# Patient Record
Sex: FEMALE | Race: OTHER | URBAN - METROPOLITAN AREA
[De-identification: names, ages, dates, MRNs, and addresses within clinical notes are randomized per-mention and may not be internally consistent; named-entity substitution may affect disease eponyms.]

---

## 2023-12-08 LAB
ABO, EXTERNAL RESULT: NORMAL
C. TRACHOMATIS, EXTERNAL RESULT: NEGATIVE
HEP B, EXTERNAL RESULT: NEGATIVE
HEPATITIS C ANTIBODY, EXTERNAL RESULT: NEGATIVE
HIV, EXTERNAL RESULT: NEGATIVE
N. GONORRHOEAE, EXTERNAL RESULT: NEGATIVE
RH FACTOR, EXTERNAL RESULT: POSITIVE
RPR, EXTERNAL RESULT: NON REACTIVE
RUBELLA TITER, EXTERNAL RESULT: NORMAL

## 2023-12-27 DIAGNOSIS — O30.032 MONOCHORIONIC DIAMNIOTIC TWIN GESTATION IN SECOND TRIMESTER: ICD-10-CM

## 2024-01-10 ENCOUNTER — ROUTINE PRENATAL (OUTPATIENT)
Age: 34
End: 2024-01-10
Payer: MEDICAID

## 2024-01-10 VITALS — HEART RATE: 114 BPM | DIASTOLIC BLOOD PRESSURE: 71 MMHG | SYSTOLIC BLOOD PRESSURE: 106 MMHG

## 2024-01-10 DIAGNOSIS — O30.032 MONOCHORIONIC DIAMNIOTIC TWIN GESTATION IN SECOND TRIMESTER: Primary | ICD-10-CM

## 2024-01-10 DIAGNOSIS — O09.522 MULTIGRAVIDA OF ADVANCED MATERNAL AGE IN SECOND TRIMESTER: ICD-10-CM

## 2024-01-10 PROCEDURE — 76821 MIDDLE CEREBRAL ARTERY ECHO: CPT | Performed by: OBSTETRICS & GYNECOLOGY

## 2024-01-10 PROCEDURE — 76810 OB US >/= 14 WKS ADDL FETUS: CPT | Performed by: OBSTETRICS & GYNECOLOGY

## 2024-01-10 PROCEDURE — 99213 OFFICE O/P EST LOW 20 MIN: CPT | Performed by: OBSTETRICS & GYNECOLOGY

## 2024-01-10 PROCEDURE — 76820 UMBILICAL ARTERY ECHO: CPT | Performed by: OBSTETRICS & GYNECOLOGY

## 2024-01-10 PROCEDURE — 76816 OB US FOLLOW-UP PER FETUS: CPT | Performed by: OBSTETRICS & GYNECOLOGY

## 2024-01-10 RX ORDER — ASPIRIN 81 MG/1
81 TABLET ORAL DAILY
COMMUNITY

## 2024-01-11 NOTE — PROCEDURES
Yenny  .1 mm 25w 0d 17% Hadlock  .4 mm 26w 1d 68% Hadlock  Femur 47.1 mm 25w 5d 50% Hadlock  Humerus 41.7 mm 25w 1d 36% Yenny   g 25w 4d 64% Hadlock  EFW discordance 7.2 %  EFW (lb) 1 lb  EFW (oz) 14 oz  EFW by: Hadlock (BPD-HC-AC-FL)  Extended   3.5 mm  Other Structures   bpm    General Evaluation  ==============    Cardiac activity present.  bpm. Fetal movements: visualized. Presentation: Transverse, head to maternal left  Placenta: Placental site: Anterior  Umbilical cord: Cord vessels: 3 vessel cord  Amniotic fluid: Amount of AF: normal. MVP 5.6 cm    Fetal Anatomy  ===========    Lateral ventricles: normal  Midline falx: normal  Cavum septi pellucidi: normal  Heart / Thorax  Ductal arch view: NOT VISUALIZED  Cardiac rhythm: regular (normal)  Diaphragm: normal  Stomach: normal  Kidneys: normal  Bladder: normal  Rt fingers: normal  Lt fingers: NOT VISUALIZED  Wants to know fetal sex: yes    Fetal Doppler  ===========    Arterial  Umbilical artery: normal  Umbilical A PI 1.47  99% Ebbing  Umbilical A RI 0.78  90% Stanley  Umbilical A PS 29.09 cm/s  1% Ebbing  Umbilical A ED 6.32 cm/s  Umbilical A EDF: positive  Umbilical A TAmax 15.52 cm/s  <1% Ebbing  Umbilical A MD 6.17 cm/s  Umbilical A S / D 4.60  93% Stanley  Umbilical A  bpm  Right mid cerebral artery: normal  Rt MCA PI 1.84  35% Ebbing  Rt MCA RI 0.80  55% Bahlmann  Rt MCA PS 29.42 cm/s  Rt MCA ED 6.17 cm/s  Rt MCA TAmax 13.06 cm/s  35% Ebbing  Rt MCA MD 5.79 cm/s  Rt MCA S / D 4.85  Rt MCA  bpm    Fetal Doppler  ===========    Arterial  Umbilical artery: normal  Umbilical A PI 1.25  87% Ebbing  Umbilical A RI 0.72  67% Stanley  Umbilical A PS 37.80 cm/s  30% Ebbing  Umbilical A ED 10.73 cm/s  Umbilical A EDF: positive  Umbilical A TAmax 24.33 cm/s  34% Ebbing  Umbilical A MD 10.55 cm/s  Umbilical A S / D 3.52  59% Stanley  Umbilical A  bpm  Right mid cerebral artery: normal  Rt MCA PI 1.98  52%

## 2024-01-15 LAB
CFDNA.FET/CFDNA.TOTAL SFR FETUS: ABNORMAL %
CITATION REF LAB TEST: ABNORMAL
FET 13+18+21+X+Y ANEUP PLAS.CFDNA: ABNORMAL
GA EST FROM CONCEPTION DATE: ABNORMAL D
GESTATIONAL AGE > OR = 9 WEEKS: YES
LAB DIRECTOR NAME PROVIDER: ABNORMAL
LAB DIRECTOR NAME PROVIDER: ABNORMAL
LABORATORY COMMENT REPORT: ABNORMAL
LIMITATIONS OF THE TEST: ABNORMAL
Lab: ABNORMAL
NEGATIVE PREDICTIVE VALUE: ABNORMAL
PERFORMANCE CHARACTERISTICS: ABNORMAL
REF LAB TEST METHOD: ABNORMAL
TEST PERFORMANCE INFO SPEC: ABNORMAL

## 2024-01-18 ENCOUNTER — TELEPHONE (OUTPATIENT)
Age: 34
End: 2024-01-18

## 2024-01-19 ENCOUNTER — INITIAL PRENATAL (OUTPATIENT)
Age: 34
End: 2024-01-19

## 2024-01-19 VITALS
WEIGHT: 213.6 LBS | HEIGHT: 66 IN | SYSTOLIC BLOOD PRESSURE: 102 MMHG | BODY MASS INDEX: 34.33 KG/M2 | DIASTOLIC BLOOD PRESSURE: 60 MMHG

## 2024-01-19 DIAGNOSIS — Z34.90 PREGNANCY, UNSPECIFIED GESTATIONAL AGE: Primary | ICD-10-CM

## 2024-01-19 NOTE — PROGRESS NOTES
.  
palpable  Hernias: no hernias identified    Skin  General Inspection: no rash, no lesions identified    Neurologic/Psychiatric  Mental Status:  Orientation: grossly oriented to person, place and time  Mood and Affect: mood normal, affect appropriate      Assessment/Plan:  Primary Provider: Adam    33 y.o.  with mono-di twin gestation at 26w4d transferring care from [facility].     H/o TSVD x4, uncomplicated pregnancies, TT 1sl68ir. One of her children  at 4 months of age from pneumonia.    Unable to view records from [facility] due to small size when scanned into media --> Requesting that the records be resent.    IUP: MONO-DI TWINS  -Anatomy scan w/ MFM --> last scan 1/10/24: Fetus A: cephalic, maternal leF presenta on. Placenta is Anterior. EFW is 798 g at 41% and AC at 20%. Anatomy visualized as stated above. Maximum vertical pocket is normal, 4.8 cm. Fetus B: Transverse, head to maternal leF presenta on. Placenta is Anterior. EFW is 860 g at 64% and AC at 68%. Anatomy visualized as stated above. Maximum tez jose pocket is normal, 5.6 cm. EFW discordance is 7.2 %. UAD & MCA WNL x 2.  TTTS check in 2wks.  -MFM scheduled     Pregnancy Problems:  -AMA    PMH: benign    Genetics/Carrier screening: NIPT drawn with MFM, results pending    PNL: request records for New OB labs  -Glucola: next visit  -28 week labs: next visit  -GBS:    Vaccines:  -Flu:  -Covid:  -Tdap: next visit  -Rhogam:    Delivery/PP plans:  -Breast/Formula  -NCB/Epid  -Gender/Circ?    Social:  -FOB:  -Citizen of [place]-speaking, from [facility]. 2 children are here with her, and one child still in [facility].    RTC: 2 weeks or sooner angel luis Medina MD  2024  4:45 PM

## 2024-01-24 ENCOUNTER — ROUTINE PRENATAL (OUTPATIENT)
Age: 34
End: 2024-01-24
Payer: MEDICAID

## 2024-01-24 VITALS — DIASTOLIC BLOOD PRESSURE: 76 MMHG | HEART RATE: 108 BPM | SYSTOLIC BLOOD PRESSURE: 122 MMHG

## 2024-01-24 DIAGNOSIS — O30.032 MONOCHORIONIC DIAMNIOTIC TWIN GESTATION IN SECOND TRIMESTER: Primary | ICD-10-CM

## 2024-01-24 DIAGNOSIS — O30.032 MONOCHORIONIC DIAMNIOTIC TWIN GESTATION IN SECOND TRIMESTER: ICD-10-CM

## 2024-01-24 DIAGNOSIS — O09.522 MULTIGRAVIDA OF ADVANCED MATERNAL AGE IN SECOND TRIMESTER: ICD-10-CM

## 2024-01-24 PROCEDURE — 99213 OFFICE O/P EST LOW 20 MIN: CPT | Performed by: OBSTETRICS & GYNECOLOGY

## 2024-01-24 PROCEDURE — 76810 OB US >/= 14 WKS ADDL FETUS: CPT | Performed by: OBSTETRICS & GYNECOLOGY

## 2024-01-24 PROCEDURE — 76816 OB US FOLLOW-UP PER FETUS: CPT | Performed by: OBSTETRICS & GYNECOLOGY

## 2024-01-24 PROCEDURE — 76821 MIDDLE CEREBRAL ARTERY ECHO: CPT | Performed by: OBSTETRICS & GYNECOLOGY

## 2024-01-24 PROCEDURE — 76820 UMBILICAL ARTERY ECHO: CPT | Performed by: OBSTETRICS & GYNECOLOGY

## 2024-01-24 PROCEDURE — 76815 OB US LIMITED FETUS(S): CPT | Performed by: OBSTETRICS & GYNECOLOGY

## 2024-01-24 NOTE — PROCEDURES
PATIENT: SHELLY PATTERSON   -  : 1990   -  DOS:2024   -  INTERPRETING PROVIDER:Kathleen Springer,   Indication  ========    Mono/Di twins, AMA    Method  ======    Transabdominal ultrasound examination. View: Sufficient    Pregnancy  =========    twin pregnancy. Number of fetuses: 2. Monochorionic-diamniotic    Dating  ======    LMP on: 2023  Cycle: regular cycle  GA by LMP 27 w + 2 d  MORALES by LMP: 2024  Assigned: based on the LMP, selected on 2023  Assigned GA 27 w + 2 d  Assigned MORALES: 2024    General Evaluation  ==============    Cardiac activity present.  bpm. Fetal movements: visualized. Presentation: cephalic, maternal left  Placenta: Placental site: anterior, appropriate distance from the internal os  Amniotic fluid: Amount of AF: normal. MVP 4.0 cm    Fetal Anatomy  ===========    Lateral ventricles: normal  4-chamber view: normal  Stomach: normal  Kidneys: normal  Bladder: normal  Wants to know fetal sex: yes    Fetal Doppler  ===========    Arterial  Umbilical A PI 1.16 81% Ebbing  Umbilical A RI 0.74 84% Stanley  Umbilical A PS 31.54 cm/s 2% Ebbing  Umbilical A ED 8.21 cm/s  Umbilical A TAmax 20.04 cm/s 3% Ebbing  Umbilical A MD 8.15 cm/s  Umbilical A S / D 3.84 82% Stanley  Umbilical A  bpm  Rt MCA PI 1.44 2% Ebbing  Rt MCA RI 0.72 17% Bahlmann  Rt MCA PS 25.80 cm/s  Rt MCA ED 7.14 cm/s  Rt MCA TAmax 12.99 cm/s 19% Ebbing  Rt MCA MD 7.00 cm/s  Rt MCA S / D 3.61  Rt MCA  bpm    General Evaluation  ==============    Cardiac activity present.  bpm. Fetal movements: visualized. Presentation: Transverse, head to maternal right  Placenta: Placental site: anterior, appropriate distance from the internal os  Amniotic fluid: Amount of AF: normal. MVP 6.9 cm    Fetal Anatomy  ===========    Lateral ventricles: normal  4-chamber view: normal  Stomach: normal  Kidneys: normal  Bladder: normal  Wants to know fetal sex: yes    Fetal

## 2024-01-25 ENCOUNTER — CLINICAL DOCUMENTATION (OUTPATIENT)
Age: 34
End: 2024-01-25

## 2024-01-25 DIAGNOSIS — Z34.90 PREGNANCY, UNSPECIFIED GESTATIONAL AGE: Primary | ICD-10-CM

## 2024-01-29 ENCOUNTER — ROUTINE PRENATAL (OUTPATIENT)
Age: 34
End: 2024-01-29
Payer: MEDICAID

## 2024-01-29 VITALS — BODY MASS INDEX: 34.38 KG/M2 | SYSTOLIC BLOOD PRESSURE: 116 MMHG | DIASTOLIC BLOOD PRESSURE: 74 MMHG | WEIGHT: 213 LBS

## 2024-01-29 DIAGNOSIS — Z23 ENCOUNTER FOR IMMUNIZATION: ICD-10-CM

## 2024-01-29 DIAGNOSIS — Z34.90 PREGNANCY, UNSPECIFIED GESTATIONAL AGE: Primary | ICD-10-CM

## 2024-01-29 LAB
BLOOD BANK CMNT PATIENT-IMP: NORMAL
BLOOD GROUP ANTIBODIES SERPL: NORMAL
CFDNA.FET/CFDNA.TOTAL SFR FETUS: ABNORMAL %
CITATION REF LAB TEST: ABNORMAL
ERYTHROCYTE [DISTWIDTH] IN BLOOD BY AUTOMATED COUNT: 12.8 % (ref 11.5–14.5)
FET 13+18+21+X+Y ANEUP PLAS.CFDNA: ABNORMAL
GA EST FROM CONCEPTION DATE: ABNORMAL D
GESTATIONAL AGE > OR = 9 WEEKS: YES
GLUCOSE 1H P 100 G GLC PO SERPL-MCNC: 146 MG/DL (ref 65–140)
HCT VFR BLD AUTO: 34.6 % (ref 35–47)
HGB BLD-MCNC: 11 G/DL (ref 11.5–16)
HIV 1+2 AB+HIV1 P24 AG SERPL QL IA: NONREACTIVE
HIV 1/2 RESULT COMMENT: NORMAL
LAB DIRECTOR NAME PROVIDER: ABNORMAL
LAB DIRECTOR NAME PROVIDER: ABNORMAL
LABORATORY COMMENT REPORT: ABNORMAL
LIMITATIONS OF THE TEST: ABNORMAL
Lab: ABNORMAL
MCH RBC QN AUTO: 28.3 PG (ref 26–34)
MCHC RBC AUTO-ENTMCNC: 31.8 G/DL (ref 30–36.5)
MCV RBC AUTO: 88.9 FL (ref 80–99)
NEGATIVE PREDICTIVE VALUE: ABNORMAL
NRBC # BLD: 0 K/UL (ref 0–0.01)
NRBC BLD-RTO: 0 PER 100 WBC
PERFORMANCE CHARACTERISTICS: ABNORMAL
PLATELET # BLD AUTO: 375 K/UL (ref 150–400)
PMV BLD AUTO: 10 FL (ref 8.9–12.9)
RBC # BLD AUTO: 3.89 M/UL (ref 3.8–5.2)
REF LAB TEST METHOD: ABNORMAL
T. PALLIDUM (SYPHILIS) ANTIBODY, EXTERNAL RESULT: NON REACTIVE
TEST PERFORMANCE INFO SPEC: ABNORMAL
WBC # BLD AUTO: 9.5 K/UL (ref 3.6–11)

## 2024-01-29 PROCEDURE — 90715 TDAP VACCINE 7 YRS/> IM: CPT | Performed by: OBSTETRICS & GYNECOLOGY

## 2024-01-29 PROCEDURE — 90471 IMMUNIZATION ADMIN: CPT | Performed by: OBSTETRICS & GYNECOLOGY

## 2024-01-29 PROCEDURE — 0502F SUBSEQUENT PRENATAL CARE: CPT | Performed by: OBSTETRICS & GYNECOLOGY

## 2024-01-29 NOTE — PROGRESS NOTES
# [id number] used to conduct today's visit  1 hr gtt and 3rd tri labs today  Accepts Tdap today  Buzz MATHEW  Saw [facility] 1/24, follow up 2/8    Fetus A: cephalic, maternal Mara presentation. Cardiac activity is present. MVP is 4 cm. Anatomy visualized as stated above. Placental site is anterior, appropriate distance from the internal os. Umbilical and middle cerebral artery Doppler studies were performed and are WNL     Fetus B: Transverse, head to maternal right presentation. Cardiac activity is present. MVP is 6.9 cm. Anatomy visualized as stated above. Placental site is anterior, appropriate distance from the internal os. Umbilical and middle cerebral artery Doppler studies were performed and are WNL    -cfDNA was drawn 2 weeks ago but cancelled by [facility] due to incorrect dating on form (patient had postponed testing for 3 weeks); testing was re-ordered today and patient was instructed to go to [facility] today.  Per patient did go to [facility] and have [id number] testing done on 1/25

## 2024-01-30 LAB — T PALLIDUM AB SER QL IA: NON REACTIVE

## 2024-01-31 ENCOUNTER — TELEPHONE (OUTPATIENT)
Age: 34
End: 2024-01-31

## 2024-01-31 NOTE — TELEPHONE ENCOUNTER
Attempted to reach patient via  services, # [phone].  Unable to leave a voicemail; voicemail box has not been set up yet.  Will try again later.

## 2024-02-05 ENCOUNTER — TELEPHONE (OUTPATIENT)
Age: 34
End: 2024-02-05

## 2024-02-05 NOTE — TELEPHONE ENCOUNTER
Spoke with patient via  services, # [id number].  Advised of results and recommendations.  Appt scheduled for Wednesday 2/7 @ 0800, aware npo after midnight.

## 2024-02-07 ENCOUNTER — NURSE ONLY (OUTPATIENT)
Age: 34
End: 2024-02-07

## 2024-02-07 DIAGNOSIS — R73.09 ELEVATED GLUCOSE TOLERANCE TEST: Primary | ICD-10-CM

## 2024-02-07 LAB
GESTATIONAL 3HR GTT: NORMAL
GLUCOSE 1H P 100 G GLC PO SERPL-MCNC: 151 MG/DL (ref 65–180)
GLUCOSE 2 HOUR: 115 MG/DL (ref 65–155)
GLUCOSE P FAST SERPL-MCNC: 82 MG/DL (ref 65–95)
GLUCOSE, 3 HOUR: 96 MG/DL (ref 65–140)

## 2024-02-08 ENCOUNTER — ROUTINE PRENATAL (OUTPATIENT)
Age: 34
End: 2024-02-08
Payer: MEDICAID

## 2024-02-08 VITALS — DIASTOLIC BLOOD PRESSURE: 66 MMHG | HEART RATE: 67 BPM | SYSTOLIC BLOOD PRESSURE: 104 MMHG

## 2024-02-08 DIAGNOSIS — O09.522 MULTIGRAVIDA OF ADVANCED MATERNAL AGE IN SECOND TRIMESTER: ICD-10-CM

## 2024-02-08 DIAGNOSIS — O30.033 MONOCHORIONIC DIAMNIOTIC TWIN GESTATION IN THIRD TRIMESTER: Primary | ICD-10-CM

## 2024-02-08 PROCEDURE — 76810 OB US >/= 14 WKS ADDL FETUS: CPT | Performed by: OBSTETRICS & GYNECOLOGY

## 2024-02-08 PROCEDURE — 99213 OFFICE O/P EST LOW 20 MIN: CPT

## 2024-02-08 PROCEDURE — 76816 OB US FOLLOW-UP PER FETUS: CPT | Performed by: OBSTETRICS & GYNECOLOGY

## 2024-02-08 NOTE — PROCEDURES
Structures   bpm    General Evaluation  ==============    Cardiac activity present.  bpm. Fetal movements: visualized. Presentation: transverse head maternal left, superior  Placenta: Placental site: anterior, appropriate distance from the internal os  Amniotic fluid: Amount of AF: normal. MVP 4.1 cm    Fetal Anatomy  ===========    Stomach: normal  Kidneys: normal  Bladder: normal  Wants to know fetal sex: yes    Fetal Doppler  ===========    Arterial  Umbilical artery: normal  Umbilical A sampling site: midcord  Umbilical A PI 1.20  91% Ebbing  Umbilical A RI 0.72  85% Stanley  Umbilical A PS -43.97 cm/s  Umbilical A ED -13.17 cm/s  Umbilical A EDF: positive  Umbilical A TAmax -26.68 cm/s  Umbilical A MD -12.67 cm/s  Umbilical A S / D 3.52  79% Stanley  Umbilical A  bpm  MCA PS 36.08 cm/s  MoM 0.92  Right mid cerebral artery: normal  Rt MCA PI 1.85  18% Ebbing  Rt MCA RI 0.78  40% Bahlmann  Rt MCA PS 36.08 cm/s  Rt MCA ED 8.49 cm/s  Rt MCA TAmax 15.18 cm/s  28% Ebbing  Rt MCA MD 6.53 cm/s  Rt MCA S / D 4.50  Rt MCA  bpm    Fetal Doppler  ===========    Arterial  Umbilical artery: normal  Umbilical A sampling site: midcord  Umbilical A PI 1.33  98% Ebbing  Umbilical A RI 0.76  94% Stanley  Umbilical A PS 50.47 cm/s  76% Ebbing  Umbilical A ED 12.83 cm/s  Umbilical A EDF: positive  Umbilical A TAmax 28.95 cm/s  47% Ebbing  Umbilical A MD 12.42 cm/s  Umbilical A S / D 4.24  96% Stanley  Umbilical A  bpm  MCA PS 38.76 cm/s  MoM 0.98  Right mid cerebral artery: normal  Rt MCA PI 1.96  28% Ebbing  Rt MCA RI 0.82  61% Bahlmann  Rt MCA PS 38.76 cm/s  Rt MCA ED 6.68 cm/s  Rt MCA TAmax 16.01 cm/s  38% Ebbing  Rt MCA MD 6.30 cm/s  Rt MCA S / D 5.57  Rt MCA  bpm    Findings  =======    Viable Monochorionic-diamniotic twin pregnancy at 29w 3d by clinical dates.    Fetus A: cephalic, maternal left presentation. Placenta is anterior, appropriate distance from the internal os. EFW is 1363 g

## 2024-02-08 NOTE — PROGRESS NOTES
ASSESSMENT/PLAN:  1. Monochorionic diamniotic twin gestation in third trimester  2. Multigravida of advanced maternal age in second trimester   Latisha #[id number] was used to conduct this visit.     Nano is a 32 yo  with the following issues:      1) monochorionic diamniotic twin pregnancy  -she is on ldASA   -discussed no signs TTTS today     2) AMA  -cfDNA was drawn and no results today. [facility] contacted, states results should be available in 1 week.   -24=3hr gtt wnl   -Kick count and PIH precautions reviewed.     Discussed Follow up 2 weeks BPP then  surveillance weekly thereafter.     Please see [facility]point for ultrasound findings.      Subjective   Nano Canales (:  1990) is a 33 y.o. female,Established patient, here for evaluation of the following chief complaint(s):  1. Monochorionic diamniotic twin gestation in third trimester  2. Multigravida of advanced maternal age in second trimester     Objective   Physical Exam  Vitals reviewed.   Constitutional:       Appearance: Normal appearance.   Neurological:      Mental Status: She is alert.   Psychiatric:         Mood and Affect: Mood normal.         Judgment: Judgment normal.          On this date 2024 I have spent 25 minutes reviewing previous notes, test results and face to face with the patient discussing the diagnosis and importance of compliance with the treatment plan as well as documenting on the day of the visit.      An electronic signature was used to authenticate this note.    --ROSA Raymond - CNP

## 2024-02-10 LAB
CFDNA.FET/CFDNA.TOTAL SFR FETUS: NORMAL %
CITATION REF LAB TEST: NORMAL
FET 13+18+21+X+Y ANEUP PLAS.CFDNA: NEGATIVE
FET CHR 21 TS PLAS.CFDNA QL: NEGATIVE
FET SEX PLAS.CFDNA DOSAGE CFDNA: NORMAL
FET TS 13 RISK PLAS.CFDNA QL: NEGATIVE
FET TS 18 RISK WBC.DNA+CFDNA QL: NEGATIVE
GA EST FROM CONCEPTION DATE: NORMAL D
GESTATIONAL AGE > OR = 9 WEEKS: YES
LAB DIRECTOR NAME PROVIDER: NORMAL
LAB DIRECTOR NAME PROVIDER: NORMAL
LABORATORY COMMENT REPORT: NORMAL
LIMITATIONS OF THE TEST: NORMAL
Lab: NORMAL
NEGATIVE PREDICTIVE VALUE: NORMAL
PERFORMANCE CHARACTERISTICS: NORMAL
POSITIVE PREDICTIVE VALUE: NORMAL
REF LAB TEST METHOD: NORMAL
TEST PERFORMANCE INFO SPEC: NORMAL

## 2024-02-15 ENCOUNTER — ROUTINE PRENATAL (OUTPATIENT)
Age: 34
End: 2024-02-15

## 2024-02-15 VITALS — BODY MASS INDEX: 35.35 KG/M2 | SYSTOLIC BLOOD PRESSURE: 116 MMHG | WEIGHT: 219 LBS | DIASTOLIC BLOOD PRESSURE: 72 MMHG

## 2024-02-15 DIAGNOSIS — Z34.90 PREGNANCY, UNSPECIFIED GESTATIONAL AGE: Primary | ICD-10-CM

## 2024-02-15 PROCEDURE — 0502F SUBSEQUENT PRENATAL CARE: CPT | Performed by: OBSTETRICS & GYNECOLOGY

## 2024-02-15 RX ORDER — RESPIRATORY SYNCYTIAL VIRUS VACCINE 120MCG/0.5
0.5 KIT INTRAMUSCULAR ONCE
Qty: 0.5 ML | Refills: 0 | Status: SHIPPED | OUTPATIENT
Start: 2024-02-15 | End: 2024-02-15

## 2024-02-15 NOTE — PROGRESS NOTES
# [id number]  3 hr gtt wnl  Hgb 11.0-- please advise of need for iron supp  +FM, no LOF or VB.   Slight increase in headaches, normotensive today. Tylenol prn, advised mag daily.   F/u PNC     Saw MFM : Fetus A: cephalic, maternal left presentation. Placenta is anterior, appropriate distance from the internal os. EFW is 1363 g at 31% (AC = 25th%). Anatomy visualized as stated above. Maximum vertical pocket is normal, 4 cm. Umbilical and middle cerebral artery Doppler studies are WNL. Fetus B: transverse head maternal leF, superior presentation. Placenta is anterior, appropriate distance from the internal os. EFW is 1444 g at 47% (AC = 49th%). Anatomy visualized as stated above. Maximum vertical pocket is normal, 4.1 cm. Umbilical and middle cerebral artery Doppler studies are WNL. EFW discordance is 5.6 %. Plan of Care NP     1) monochorionic diamnio c twin pregnancy -is on ldASA -discussed no signs TTTS today -women with uncomplicated mono-di pregnancies may be delivered at [id number] 2) AMA -cfDNA was drawn and no results today. [facility] contacted, states results should be available in 1 week. -24=3hr Danielle wnl -Kick count and PIH precautions reviewed. Recommendations: Follow up 2 weeks BPP then  surveillance weekly thereafter.

## 2024-02-16 ENCOUNTER — TELEPHONE (OUTPATIENT)
Age: 34
End: 2024-02-16

## 2024-02-16 NOTE — TELEPHONE ENCOUNTER
I attempted to contact the patient, Nano Feldman, today over the phone using a [place] telephone  (ID# [id number]). Patient did not answer, and phone played message that \"caller has restrictions and can't accept calls at this time\" so we were unable to leave a voicemail requesting a call back.    Virginie Echols MS, [facility]  Licensed, Certified Genetic Counselor

## 2024-02-22 ENCOUNTER — ROUTINE PRENATAL (OUTPATIENT)
Age: 34
End: 2024-02-22

## 2024-02-22 VITALS — DIASTOLIC BLOOD PRESSURE: 63 MMHG | SYSTOLIC BLOOD PRESSURE: 100 MMHG | HEART RATE: 85 BPM

## 2024-02-22 DIAGNOSIS — O09.523 MULTIGRAVIDA OF ADVANCED MATERNAL AGE IN THIRD TRIMESTER: ICD-10-CM

## 2024-02-22 DIAGNOSIS — O30.033 MONOCHORIONIC DIAMNIOTIC TWIN GESTATION IN THIRD TRIMESTER: Primary | ICD-10-CM

## 2024-02-22 NOTE — PROCEDURES
PATIENT: SHELLY PATTERSON   -  : 1990   -  DOS:2024   -  INTERPRETING PROVIDER:Kathleen Springer,   Indication  ========    Mono/Di twins, AMA    Method  ======    Transabdominal ultrasound examination. View: Suboptimal view: limited by maternal body habitus. Suboptimal view: limited by fetal position. Suboptimal view: limited by  late gestational age    Pregnancy  =========    twin pregnancy. Number of fetuses: 2. Monochorionic-diamniotic    Dating  ======    LMP on: 2023  Cycle: regular cycle  GA by LMP 31 w + 3 d  MORALES by LMP: 2024  Ultrasound examination on: 2024  GA by U/S based upon: AC, BPD, Femur, HC  GA by U/S 30 w + 6 d  MORALES by U/S: 2024  GA by U/S based upon (Fetus 2): AC, BPD, Femur, HC  GA by U/S (Fetus 2) 31 w + 2 d  MORALES by U/S (Fetus 2): 2024  Assigned: based on the LMP, selected on 2023  Assigned GA 31 w + 3 d  Assigned MORALES: 2024    Fetal Biometry  ============    Standard  BPD 78.3 mm 31w 3d 39% Hadlock  .9 mm 33w 0d 86% Yenny  .9 mm 31w 4d 17% Hadlock  .1 mm 31w 2d 43% Hadlock  Femur 55.6 mm 29w 2d 2% Hadlock  Humerus 51.4 mm 30w 0d 16% Yenny  EFW 1,623 g 30w 2d 19% Hadlock  EFW discordance 11.1 %  EFW (lb) 3 lb  EFW (oz) 9 oz  EFW by: Hadlock (BPD-HC-AC-FL)  Other Structures   bpm    General Evaluation  ==============    Cardiac activity present.  bpm. Fetal movements: visualized. Presentation: cephalic, maternal left  Placenta: Placental site: anterior, appropriate distance from the internal os  Amniotic fluid: Amount of AF: normal. MVP 3.9 cm    Fetal Anatomy  ===========    Stomach: normal  Kidneys: normal  Bladder: normal  Wants to know fetal sex: yes    Fetal Biometry  ============    Standard  BPD 77.3 mm 31w 0d 27% Hadlock  OFD 97.5 mm 31w 4d 52% Yenny  .2 mm 30w 1d 2% Hadlock  .1 mm 32w 4d 79% Hadlock  Femur 59.8 mm 31w 1d 28% Hadlock  Humerus 52.2 mm 30w 3d 25% Yenny  EFW 1,826

## 2024-02-27 ENCOUNTER — ROUTINE PRENATAL (OUTPATIENT)
Age: 34
End: 2024-02-27

## 2024-02-27 VITALS — DIASTOLIC BLOOD PRESSURE: 68 MMHG | SYSTOLIC BLOOD PRESSURE: 98 MMHG | WEIGHT: 222.6 LBS | BODY MASS INDEX: 35.93 KG/M2

## 2024-02-27 DIAGNOSIS — Z34.90 PREGNANCY, UNSPECIFIED GESTATIONAL AGE: Primary | ICD-10-CM

## 2024-02-27 LAB — GBS, EXTERNAL RESULT: POSITIVE

## 2024-02-27 PROCEDURE — 0502F SUBSEQUENT PRENATAL CARE: CPT | Performed by: OBSTETRICS & GYNECOLOGY

## 2024-02-27 RX ORDER — RESPIRATORY SYNCYTIAL VIRUS VACCINE 120MCG/0.5
0.5 KIT INTRAMUSCULAR ONCE
Qty: 0.5 ML | Refills: 0 | Status: SHIPPED | OUTPATIENT
Start: 2024-02-27 | End: 2024-02-27

## 2024-02-27 NOTE — PROGRESS NOTES
# [id number]  GBS today  Saw [facility] 2/22: fetus A: cephalic, maternal left presentation. Placenta is anterior, appropriate distance from the internal os. EFW is 1623 g at 19%. Anatomy visualized as stated above. Fetus B: Transverse, head to maternal left, superior presentation. Placenta is anterior, appropriate distance from the internal os. EFW is 1826 g at 48%. Anatomy visualized as stated above. EFW discordance is 11.1 %. Biophysical Profile without NST (76819 X 2) Fetus A: cephalic, maternal left. Biophysical score is 8/8. Maximum vertical pocket is normal, 3.9 cm. Fetus B: Transverse, head to maternal left, superior. Biophysical score is 8/8. Maximum vertical pocket is normal, 3.9 cm. Umbilical and middle cerebral artery Doppler studies are WNL  Follow up [facility] appt 2/29  +FM  Denies VB/LOF/HA's/VC    RTC 2 wk

## 2024-02-29 ENCOUNTER — ROUTINE PRENATAL (OUTPATIENT)
Age: 34
End: 2024-02-29
Payer: MEDICAID

## 2024-02-29 VITALS — DIASTOLIC BLOOD PRESSURE: 65 MMHG | HEART RATE: 79 BPM | SYSTOLIC BLOOD PRESSURE: 101 MMHG

## 2024-02-29 DIAGNOSIS — O30.033 MONOCHORIONIC DIAMNIOTIC TWIN GESTATION IN THIRD TRIMESTER: Primary | ICD-10-CM

## 2024-02-29 PROCEDURE — 76820 UMBILICAL ARTERY ECHO: CPT | Performed by: OBSTETRICS & GYNECOLOGY

## 2024-02-29 PROCEDURE — 99213 OFFICE O/P EST LOW 20 MIN: CPT | Performed by: OBSTETRICS & GYNECOLOGY

## 2024-02-29 PROCEDURE — 76819 FETAL BIOPHYS PROFIL W/O NST: CPT | Performed by: OBSTETRICS & GYNECOLOGY

## 2024-02-29 NOTE — PROCEDURES
PATIENT: SHELLY PATTERSON   -  : 1990   -  DOS:2024   -  INTERPRETING PROVIDER:Chi Mott,   Indication  ========    Mono/Di twins, AMA    Method  ======    Transabdominal ultrasound examination. View: Suboptimal view: limited by maternal body habitus. Suboptimal view: limited by fetal position. Suboptimal view: limited by  late gestational age    Pregnancy  =========    twin pregnancy. Number of fetuses: 2. Monochorionic-diamniotic    Dating  ======    LMP on: 2023  Cycle: regular cycle  GA by LMP 32 w + 3 d  MORALES by LMP: 2024  Assigned: based on the LMP, selected on 2023  Assigned GA 32 w + 3 d  Assigned MORALES: 2024    General Evaluation  ==============    Cardiac activity present.  bpm. Fetal movements: visualized. Presentation: cephalic, maternal left  Placenta: Placental site: anterior, appropriate distance from the internal os    Fetal Anatomy  ===========    Stomach: normal  Kidneys: normal  Bladder: normal  Wants to know fetal sex: yes    Amniotic Fluid Assessment  =====================    Amount of AF: normal  MVP 2.6 cm    Biophysical Profile  ==============    2: Fetal breathing movements  2: Gross body movements  2: Fetal tone  2: Amniotic fluid volume  8/8 Biophysical profile score    Fetal Doppler  ===========    Arterial  Umbilical A PI 1.26 98% Ebbing  Umbilical A RI 0.71 89% Stanley  Umbilical A PS 43.80 cm/s 30% Ebbing  Umbilical A ED 12.57 cm/s  Umbilical A TAmax 26.01 cm/s 14% Ebbing  Umbilical A MD 12.33 cm/s  Umbilical A S / D 3.48 88% Stanley  Umbilical A  bpm    General Evaluation  ==============    Cardiac activity present.  bpm. Fetal movements: visualized. Presentation: Transverse, head to maternal left, soperior  Placenta: Placental site: anterior, appropriate distance from the internal os    Fetal Anatomy  ===========    Stomach: normal  Kidneys: normal  Bladder: normal  Wants to know fetal sex: yes    Amniotic Fluid

## 2024-03-01 ENCOUNTER — HOSPITAL ENCOUNTER (EMERGENCY)
Facility: HOSPITAL | Age: 34
Discharge: OTHER INSTITUTION | End: 2024-03-01
Payer: MEDICAID

## 2024-03-01 ENCOUNTER — HOSPITAL ENCOUNTER (EMERGENCY)
Facility: HOSPITAL | Age: 34
Discharge: HOME | End: 2024-03-01
Payer: MEDICAID

## 2024-03-01 VITALS
RESPIRATION RATE: 18 BRPM | OXYGEN SATURATION: 98 % | TEMPERATURE: 99.1 F | HEART RATE: 77 BPM | DIASTOLIC BLOOD PRESSURE: 69 MMHG | SYSTOLIC BLOOD PRESSURE: 122 MMHG

## 2024-03-01 VITALS
RESPIRATION RATE: 16 BRPM | HEART RATE: 100 BPM | SYSTOLIC BLOOD PRESSURE: 144 MMHG | OXYGEN SATURATION: 98 % | DIASTOLIC BLOOD PRESSURE: 120 MMHG

## 2024-03-01 LAB
ALBUMIN SERPL-MCNC: 2.7 G/DL (ref 3.5–5)
ALBUMIN/GLOB SERPL: 0.6 (ref 1.1–2.2)
ALP SERPL-CCNC: 153 U/L (ref 45–117)
ALT SERPL-CCNC: 10 U/L (ref 12–78)
ANION GAP SERPL CALC-SCNC: 4 MMOL/L (ref 5–15)
AST SERPL-CCNC: 17 U/L (ref 15–37)
BILIRUB SERPL-MCNC: 0.3 MG/DL (ref 0.2–1)
BUN SERPL-MCNC: 4 MG/DL (ref 6–20)
BUN/CREAT SERPL: 7 (ref 12–20)
CALCIUM SERPL-MCNC: 9.1 MG/DL (ref 8.5–10.1)
CHLORIDE SERPL-SCNC: 107 MMOL/L (ref 97–108)
CO2 SERPL-SCNC: 25 MMOL/L (ref 21–32)
COMMENT:: NORMAL
CREAT SERPL-MCNC: 0.59 MG/DL (ref 0.55–1.02)
CREAT UR-MCNC: 30.1 MG/DL
EKG ATRIAL RATE: 102 BPM
EKG DIAGNOSIS: NORMAL
EKG P AXIS: 46 DEGREES
EKG P-R INTERVAL: 128 MS
EKG Q-T INTERVAL: 348 MS
EKG QRS DURATION: 96 MS
EKG QTC CALCULATION (BAZETT): 453 MS
EKG R AXIS: 45 DEGREES
EKG T AXIS: 18 DEGREES
EKG VENTRICULAR RATE: 102 BPM
ERYTHROCYTE [DISTWIDTH] IN BLOOD BY AUTOMATED COUNT: 13.1 % (ref 11.5–14.5)
FLUAV RNA SPEC QL NAA+PROBE: NOT DETECTED
FLUBV RNA SPEC QL NAA+PROBE: DETECTED
GLOBULIN SER CALC-MCNC: 4.3 G/DL (ref 2–4)
GLUCOSE SERPL-MCNC: 98 MG/DL (ref 65–100)
GP B STREP DNA SPEC QL NAA+PROBE: POSITIVE
HCT VFR BLD AUTO: 31.7 % (ref 35–47)
HGB BLD-MCNC: 10.6 G/DL (ref 11.5–16)
MCH RBC QN AUTO: 27.7 PG (ref 26–34)
MCHC RBC AUTO-ENTMCNC: 33.4 G/DL (ref 30–36.5)
MCV RBC AUTO: 83 FL (ref 80–99)
NRBC # BLD: 0 K/UL (ref 0–0.01)
NRBC BLD-RTO: 0 PER 100 WBC
PLATELET # BLD AUTO: 353 K/UL (ref 150–400)
PMV BLD AUTO: 9.4 FL (ref 8.9–12.9)
POTASSIUM SERPL-SCNC: 3.9 MMOL/L (ref 3.5–5.1)
PROT SERPL-MCNC: 7 G/DL (ref 6.4–8.2)
PROT UR-MCNC: <5 MG/DL (ref 0–11.9)
PROT/CREAT UR-RTO: <0.2
RBC # BLD AUTO: 3.82 M/UL (ref 3.8–5.2)
SARS-COV-2 RNA RESP QL NAA+PROBE: NOT DETECTED
SODIUM SERPL-SCNC: 136 MMOL/L (ref 136–145)
SPECIMEN HOLD: NORMAL
SPECIMEN SOURCE: ABNORMAL
SPECIMEN STATUS REPORT: NORMAL
WBC # BLD AUTO: 11.8 K/UL (ref 3.6–11)

## 2024-03-01 PROCEDURE — 36415 COLL VENOUS BLD VENIPUNCTURE: CPT

## 2024-03-01 PROCEDURE — 85027 COMPLETE CBC AUTOMATED: CPT

## 2024-03-01 PROCEDURE — 6370000000 HC RX 637 (ALT 250 FOR IP): Performed by: OBSTETRICS & GYNECOLOGY

## 2024-03-01 PROCEDURE — 4500000002 HC ER NO CHARGE

## 2024-03-01 PROCEDURE — 84156 ASSAY OF PROTEIN URINE: CPT

## 2024-03-01 PROCEDURE — 80053 COMPREHEN METABOLIC PANEL: CPT

## 2024-03-01 PROCEDURE — 82570 ASSAY OF URINE CREATININE: CPT

## 2024-03-01 PROCEDURE — 99285 EMERGENCY DEPT VISIT HI MDM: CPT

## 2024-03-01 PROCEDURE — 93005 ELECTROCARDIOGRAM TRACING: CPT | Performed by: EMERGENCY MEDICINE

## 2024-03-01 PROCEDURE — 87636 SARSCOV2 & INF A&B AMP PRB: CPT

## 2024-03-01 RX ORDER — OSELTAMIVIR PHOSPHATE 75 MG/1
75 CAPSULE ORAL 2 TIMES DAILY
COMMUNITY
Start: 2024-03-01 | End: 2024-03-06

## 2024-03-01 RX ORDER — ACETAMINOPHEN 500 MG
1000 TABLET ORAL ONCE
Status: COMPLETED | OUTPATIENT
Start: 2024-03-01 | End: 2024-03-01

## 2024-03-01 RX ORDER — OSELTAMIVIR PHOSPHATE 75 MG/1
75 CAPSULE ORAL 2 TIMES DAILY
Status: DISCONTINUED | OUTPATIENT
Start: 2024-03-01 | End: 2024-03-01 | Stop reason: HOSPADM

## 2024-03-01 RX ADMIN — ACETAMINOPHEN 1000 MG: 500 TABLET ORAL at 18:18

## 2024-03-01 ASSESSMENT — ENCOUNTER SYMPTOMS
NAUSEA: 1
RESPIRATORY NEGATIVE: 1
VOMITING: 1
BACK PAIN: 1

## 2024-03-01 NOTE — ED NOTES
used.     Nano Canales is a 33 y.o. female with no reported PMH who presents ambulatory to [facility] ED with cc of vomiting, weakness, and dizziness since this afternoon. Headache. Epistaxis this morning. No fever, bowel/bladder changes, vaginal bleeding, abdominal pain, any other concerns. Feeling fetal movement. 32 weeks pregnant with twins, saw OBGYN yesterday.     EKG obtained prior to triage. HR 100s-110s. BP 140s/120s in triage x 2 in L arm. Triage nurse called L&D who agreed to take patient for further evaluation. Patient wheeled up to L&D.     Karen Pierce PA-C    ED EKG Interpretation:  Time: 1630  Rhythm: sinus tachycardia; and regular . Rate (approx.): 102; EKG documented by Karen Pierce PA-C and interpreted by Dr. Curry.       Karen Pierce PA-C  03/01/24 [id number]

## 2024-03-01 NOTE — ED TRIAGE NOTES
3/1/2024  5:03 PM Patient arrived up from ED> Speaks Tongan. Will get  system.    @1720 Patient explains she had a nosebleed this morning, has had a headache on and off all day, and had 2 episodes of vomiting. Denies bleeding, leaking of fluid or contractions. Reports + fetal movement x 2. Denies any other family members feeling sick.    @1732 Dr. Rabago at bedside, using     @1745 Labs obtained    @1755 Orders received for PO tylenol and to see if patient can PO hydrate    @1822 Patient up to bathroom    @1825 Call placed to laboratory to confirm receipt of covid/flu swab. They state they are running it and it will be ready in 20-30 minutes.     @1835 Spoke with Dr. Rabago, states OK to leave patient off the monitors at this time. Will wait for labwork to result.

## 2024-03-01 NOTE — ED TRIAGE NOTES
Pt arrives from home accompanied by  w/ cc of dizziness, head ache, nausea/vomiting, & nose bleed. Pt is 32weeks pregnant w/ twins. L&D accepted pt from ED and pt wheeled up to L&D floor. Pt A&Ox4.

## 2024-03-01 NOTE — ED PROVIDER NOTES
34 y/o  @ 32w4d Presents to KENNEDY with c/o Headache, Vison changes , body aches. She was feeling fine a few days ago when she saw her OB provider Dr Medina and Then [facility] the Next Day. She denies any  known Sick Contacts. She c/o Bilateral Leg Pain but no Swelling. She denies LOF, VaginalBleeding. And reports Melinda Fetal movements of Twin babies.           No chief complaint on file.      No past medical history on file.  Notes    Progress Notes Briseida Medina MD at 2024 11:36 AM    Status: Signed    # [id number]  GBS today  Saw [facility] : fetus A: cephalic, maternal left presentation. Placenta is anterior, appropriate distance from the internal os. EFW is 1623 g at 19%. Anatomy visualized as stated above. Fetus B: Transverse, head to maternal left, superior presentation. Placenta is anterior, appropriate distance from the internal os. EFW is 1826 g at 48%. Anatomy visualized as stated above. EFW discordance is 11.1 %. Biophysical Profile without NST (76819 X 2) Fetus A: cephalic, maternal left. Biophysical score is 8/8. Maximum vertical pocket is normal, 3.9 cm. Fetus B: Transverse, head to maternal left, superior. Biophysical score is 8/8. Maximum vertical pocket is normal, 3.9 cm. Umbilical and middle cerebral artery Doppler studies are WNL  Follow up [facility] appt   +FM  Denies VB/LOF/HA's/VC     RTC 2 wk        No past surgical history on file.    Notes    Progress Notes Briseida Medina MD at 2024 11:36 AM    Status: Signed    # [id number]  GBS today  Saw [facility] : fetus A: cephalic, maternal left presentation. Placenta is anterior, appropriate distance from the internal os. EFW is 1623 g at 19%. Anatomy visualized as stated above. Fetus B: Transverse, head to maternal left, superior presentation. Placenta is anterior, appropriate distance from the internal os. EFW is 1826 g at 48%. Anatomy visualized as stated above. EFW discordance is 11.1 %. Biophysical Profile without NST

## 2024-03-02 NOTE — DISCHARGE INSTRUCTIONS
oseltamivir  Radha:  Tamiflu  ¿Cuál es la información más importante que aracelis saber sobre oseltamivir?  Algunas personas que usan oseltamivir bradford tenido cambios inusuales repentinos del humor o del comportamiento, esto es más frecuente en los niños. No hay certeza si oseltamivir es la causa exacta. Aún sin usar oseltamivir, cualquier persona con la influenza puede tener efectos neurológicos o del comportamiento que pueden resultar en confusión o alucinaciones. Llame de inmediato a kahn médico si la persona que usa esta medicina tiene cualquier signo de pensamientos o comportamiento inusuales.  ¿Qué es oseltamivir?  Oseltamivir es un medicamento antiviral que bloquea las acciones del virus de la influenza tipo A y B en kahn cuerpo.  Oseltamivir se usa para el tratamiento de los síntomas de la gripe causados por los virus de la influenza en personas que bradford tenido síntomas por menos de 2 días. Oseltamivir también puede usarse para prevenir la influenza en personas que pueden radha sido expuestas nabil aún no tienen los síntomas. Oseltamivir no trata el resfrío común.  Oseltamivir no debe usarse en lugar de recibir la vacuna contra la gripe cada año. Los [facility]erm[facility] recomiendan karan vacuna anual contra la gripe para protegerlo cada año de nuevos tipos del virus de la influenza.  Oseltamivir puede también usarse para fines no mencionados en esta guía del medicamento.  ¿Qué debería discutir con el profesional del cuidado de la bárbara antes de usar oseltamivir?  Usted no debe usar oseltamivir si usted es alérgico a éste.  No use oseltamivir para tratar los síntomas de la gripe en un sofia geetha de 2 semanas. Los niños de al menos 1 año de edad pueden usar oseltamivir para prevenir los síntomas de la gripe.  Dígale a kahn médico si alguna vez ha tenido:  enfermedad del riñón (o si está recibiendo diálisis);  enfermedad del corazón o enfermedad crónica de los pulmones;  karan condición que causa hinchazón o  recetarle algún medicamento antiviral para prevenir otros problemas de bárbara, jose francisco la neumonía. Las personas mayores y quienes tienen un problema de bárbara prolongado, jose francisco karan enfermedad pulmonar, tienen el mayor riesgo de desarrollar neumonía u otros problemas de bárbara.  La atención de seguimiento es karan parte clave de kahn tratamiento y seguridad. Asegúrese de hacer y acudir a todas las citas, y llame a kahn médico si está teniendo problemas. También es karan buena idea saber los resultados de paul exámenes y mantener karan lista de los medicamentos que ute.  ¿Cómo puede cuidarse en el hogar?  Descanse bastante.  Mirella abundantes líquidos, suficientes para que kahn orina sea de color amarillo kim o transparente jose francisco el agua. Si tiene karan enfermedad del riñón, del corazón o del hígado y tiene que limitar los líquidos, hable con kahn médico antes de aumentar kahn consumo.  Si es necesario, tome un analgésico (medicamento para el dolor) de venta christopher, jose francisoc acetaminofén (Tylenol), ibuprofeno (Advil, Motrin) o naproxeno (Aleve), para aliviar la fiebre, el dolor de maksim y los yuniel musculares. Lanny y siga todas las instrucciones de la etiqueta. Ninguna persona geetha de 20 años debe lianet aspirina. Ésta ha sido relacionada con el síndrome de Reye, karan enfermedad grave.  No fume. Fumar puede empeorar la gripe. Si necesita ayuda para dejar de fumar, hable con kahn médico sobre los programas y medicamentos para dejar de fumar. Éstos pueden aumentar paul probabilidades de dejar el hábito para siempre.  Para ayudar a despejar la nariz congestionada, respire aire húmedo de karan ducha caliente o un lavabo lleno de [place].  Antes de usar medicamentos para la tos y los resfriados, revise la etiqueta. Estos medicamentos podrían no ser seguros para los niños pequeños o las personas con ciertos problemas de bárbara.  Si le duele la piel alrededor de la nariz y los labios, aplique un poco de vaselina en la leonor.  Para aliviar la tos:  Mirella

## 2024-03-07 ENCOUNTER — ROUTINE PRENATAL (OUTPATIENT)
Age: 34
End: 2024-03-07
Payer: MEDICAID

## 2024-03-07 VITALS — DIASTOLIC BLOOD PRESSURE: 68 MMHG | HEART RATE: 79 BPM | SYSTOLIC BLOOD PRESSURE: 105 MMHG

## 2024-03-07 DIAGNOSIS — O30.033 MONOCHORIONIC DIAMNIOTIC TWIN GESTATION IN THIRD TRIMESTER: Primary | ICD-10-CM

## 2024-03-07 DIAGNOSIS — E66.09 CLASS 2 OBESITY DUE TO EXCESS CALORIES WITHOUT SERIOUS COMORBIDITY WITH BODY MASS INDEX (BMI) OF 35.0 TO 35.9 IN ADULT: ICD-10-CM

## 2024-03-07 DIAGNOSIS — O09.523 MULTIGRAVIDA OF ADVANCED MATERNAL AGE IN THIRD TRIMESTER: ICD-10-CM

## 2024-03-07 PROCEDURE — 99214 OFFICE O/P EST MOD 30 MIN: CPT | Performed by: OBSTETRICS & GYNECOLOGY

## 2024-03-07 PROCEDURE — 76810 OB US >/= 14 WKS ADDL FETUS: CPT | Performed by: OBSTETRICS & GYNECOLOGY

## 2024-03-07 PROCEDURE — 76819 FETAL BIOPHYS PROFIL W/O NST: CPT | Performed by: OBSTETRICS & GYNECOLOGY

## 2024-03-07 PROCEDURE — 76820 UMBILICAL ARTERY ECHO: CPT | Performed by: OBSTETRICS & GYNECOLOGY

## 2024-03-07 PROCEDURE — 76821 MIDDLE CEREBRAL ARTERY ECHO: CPT | Performed by: OBSTETRICS & GYNECOLOGY

## 2024-03-07 NOTE — PROCEDURES
visit.    Nano is a 32 yo  with the following issues:    1) monochorionic diamniotic twin pregnancy  -is on ldASA  -discussed no signs TTTS today  -Reassuring fetal status x2  -women with uncomplicated mono-di pregnancies may be delivered at [id number]  -PIH precautions were reviewed  -Nano was instructed to alert her primary ob if she notices decreased fetal movement    2) AMA  -cfDNA LR  -24=3hr gtt wnl    3) class 2 obesity  -24 3hr GTT WNL    Patient was counseled on the findings. We discussed that as long as maternal and fetal status remains reassuring, our goal will be delivery at 37 weeks. Questions and  concerns were addressed.  Excluding time reading the ultrasound, a total of 35 minutes was spent on this visit reviewing previous notes, counseling the patient and documenting the findings in the  note.    Recommendations  ==============    BPPs/growths in 1 week    Coding  ======    Code: O30.033  Description: Twin pregnancy, monochorionic/diamniotic  Code: 59861  Description: Fetal biophysical profile; without non-stress testing  Code: 15848  Description: Fetal biophysical profile; without non-stress testing

## 2024-03-12 ENCOUNTER — ROUTINE PRENATAL (OUTPATIENT)
Age: 34
End: 2024-03-12

## 2024-03-12 VITALS — DIASTOLIC BLOOD PRESSURE: 74 MMHG | BODY MASS INDEX: 35.28 KG/M2 | WEIGHT: 218.6 LBS | SYSTOLIC BLOOD PRESSURE: 120 MMHG

## 2024-03-12 DIAGNOSIS — Z34.90 PREGNANCY, UNSPECIFIED GESTATIONAL AGE: Primary | ICD-10-CM

## 2024-03-12 PROCEDURE — 0502F SUBSEQUENT PRENATAL CARE: CPT | Performed by: OBSTETRICS & GYNECOLOGY

## 2024-03-12 NOTE — PROGRESS NOTES
[id number]    Saw M 3/7/24: Fetus A: cephalic, maternal left. Biophysical score is 8/8. Maximum vertical pocket is normal, 4.2 cm. Placental location is anterior, appropriate distance from the internal os. Umbilical Artery Doppler studies were performed and positive end diastolic flow was demonstrated with normal resistance for the given gesta onal age. Middle cerebral artery velocimetry was performed.     Fetus B: breech, maternal right. Biophysical score is 8/8. Maximum vertical pocket is normal, 4.2 cm. Placental location is anterior, appropriate distance from the internal os. Umbilical Artery Doppler studies were performed and positive end diastolic flow was demonstrated with normal resistance for the given gestational age. Middle cerebral artery velocimetry was performed.    Seen in [facility] 3/1/24 c/o Headache, Vison changes, body aches.  PIH labs performed.  Pt reports HA resolved but continued blurry vision. Normotensive today.   +FM    Will plan IOL at 37 wks pending MFM scan on 3/14

## 2024-03-14 ENCOUNTER — ROUTINE PRENATAL (OUTPATIENT)
Age: 34
End: 2024-03-14
Payer: MEDICAID

## 2024-03-14 VITALS — SYSTOLIC BLOOD PRESSURE: 110 MMHG | HEART RATE: 85 BPM | DIASTOLIC BLOOD PRESSURE: 72 MMHG

## 2024-03-14 DIAGNOSIS — O30.033 MONOCHORIONIC DIAMNIOTIC TWIN GESTATION IN THIRD TRIMESTER: Primary | ICD-10-CM

## 2024-03-14 PROCEDURE — 99212 OFFICE O/P EST SF 10 MIN: CPT | Performed by: OBSTETRICS & GYNECOLOGY

## 2024-03-14 PROCEDURE — 76816 OB US FOLLOW-UP PER FETUS: CPT | Performed by: OBSTETRICS & GYNECOLOGY

## 2024-03-14 PROCEDURE — 76819 FETAL BIOPHYS PROFIL W/O NST: CPT | Performed by: OBSTETRICS & GYNECOLOGY

## 2024-03-14 NOTE — PROCEDURES
cm/s  40% Ebbing  Rt MCA MD 10.44 cm/s  Rt MCA S / D 4.26  Rt MCA  bpm    Findings  =======    Viable twin pregnancy at 34w 3d by clinical dates.  Monochorionic-diamniotic twin pregnancy at 34w 3d by clinical dates.    Fetus A: cephalic, maternal left presentation.  Placenta is anterior, appropriate distance from the internal os.  EFW is 2177 g at 19%.  Anatomy visualized as stated above.  Biophysical score is 8/8.  Maximum vertical pocket is normal.  Umbilical Artery Doppler studies were performed and positive end diastolic flow was demonstrated with normal resistance for the given gestational age.  Middle cerebral artery velocimetry was performed and reassuring.    Fetus B: breech, maternal right presentation.  Placenta is anterior, appropriate distance from the internal os.  EFW is 2433 g at 46%.  Anatomy visualized as stated above.  Biophysical score is 8/8.  Maximum vertical pocket is normal.    EFW discordance is 10.5 %.  Umbilical Artery Doppler studies were performed and positive end diastolic flow was demonstrated with normal resistance for the given gestational age.  Middle cerebral artery velocimetry was performed and reassuring.    Consultation  ==========      Fetus #1:  AGA (lower quartile) fetus for established dates.  Interval growth is noted, however.  Please note that sonographic estimation of fetal/birth weight is not an exact science and clinical correlation is advised.  No demonstrable abnormalities for gestational age of assessment and technical constraints of the examination.    Reassuring biophysical profile.    Normal umbilical artery Doppler study reflecting satisfactory uteroplacental function at this time.        Fetus #2:  AGA fetus for established dates.  Interval growth is noted.  Please note that sonographic estimation of fetal/birth weight is not an exact science and clinical correlation is advised.  No demonstrable abnormalities for gestational age of assessment and technical

## 2024-03-21 ENCOUNTER — ROUTINE PRENATAL (OUTPATIENT)
Age: 34
End: 2024-03-21
Payer: MEDICAID

## 2024-03-21 VITALS — DIASTOLIC BLOOD PRESSURE: 62 MMHG | HEART RATE: 73 BPM | SYSTOLIC BLOOD PRESSURE: 118 MMHG

## 2024-03-21 DIAGNOSIS — O09.523 MULTIGRAVIDA OF ADVANCED MATERNAL AGE IN THIRD TRIMESTER: ICD-10-CM

## 2024-03-21 DIAGNOSIS — E66.09 CLASS 2 OBESITY DUE TO EXCESS CALORIES WITHOUT SERIOUS COMORBIDITY WITH BODY MASS INDEX (BMI) OF 35.0 TO 35.9 IN ADULT: ICD-10-CM

## 2024-03-21 DIAGNOSIS — O30.033 MONOCHORIONIC DIAMNIOTIC TWIN GESTATION IN THIRD TRIMESTER: Primary | ICD-10-CM

## 2024-03-21 PROCEDURE — 99214 OFFICE O/P EST MOD 30 MIN: CPT | Performed by: OBSTETRICS & GYNECOLOGY

## 2024-03-21 PROCEDURE — 76821 MIDDLE CEREBRAL ARTERY ECHO: CPT | Performed by: OBSTETRICS & GYNECOLOGY

## 2024-03-21 PROCEDURE — 76819 FETAL BIOPHYS PROFIL W/O NST: CPT | Performed by: OBSTETRICS & GYNECOLOGY

## 2024-03-21 PROCEDURE — 76820 UMBILICAL ARTERY ECHO: CPT | Performed by: OBSTETRICS & GYNECOLOGY

## 2024-03-22 NOTE — PROCEDURES
PATIENT: SHELLY PATTERSON   -  : 1990   -  DOS:2024   -  INTERPRETING PROVIDER:Kathleen Springer,   Indication  ========    Twins, monochorionic/diamniotic, Advanced Maternal Age    Method  ======    Transabdominal ultrasound examination. View: Suboptimal view: limited by late gestational age    Pregnancy  =========    twin pregnancy. Number of fetuses: 2. Monochorionic-diamniotic    Dating  ======    LMP on: 2023  Cycle: regular cycle  GA by LMP 35 w + 3 d  MORALES by LMP: 2024  Assigned: based on the LMP, selected on 2023  Assigned GA 35 w + 3 d  Assigned MORALES: 2024    General Evaluation  ==============    Cardiac activity present.  bpm. Fetal movements: visualized. Presentation: cephalic, maternal left  Placenta: Placental site: anterior, appropriate distance from the internal os    Fetal Anatomy  ===========    Stomach: normal  Kidneys: normal  Bladder: normal  Wants to know fetal sex: yes    Amniotic Fluid Assessment  =====================    Amount of AF: normal  MVP 3.4 cm    Biophysical Profile  ==============    2: Fetal breathing movements  2: Gross body movements  2: Fetal tone  2: Amniotic fluid volume  8/8 Biophysical profile score    Fetal Doppler  ===========    Arterial  Umbilical artery: normal  Umbilical A sampling site: midcord  Umbilical A PI 1.15 97% Ebbing  Umbilical A RI 0.68 89% Stanley  Umbilical A PS 44.28 cm/s 26% Ebbing  Umbilical A ED 13.99 cm/s  Umbilical A EDF: positive  Umbilical A TAmax 26.34 cm/s 12% Ebbing  Umbilical A MD 12.01 cm/s  Umbilical A S / D 3.17 86% Stanley  Umbilical A  bpm  MCA PS 49.60 cm/s  MoM 0.95  MCA EDF: positive  Right mid cerebral artery: normal  Rt MCA PI 1.37 3% Ebbing  Rt MCA RI 0.72 35% Bahlmann  Rt MCA PS 49.60 cm/s  Rt MCA ED 15.00 cm/s  Rt MCA TAmax 26.55 cm/s 71% Ebbing  Rt MCA MD 14.38 cm/s  Rt MCA S / D 3.64  Rt MCA  bpm    General Evaluation  ==============    Cardiac activity present. FHR

## 2024-03-25 NOTE — PROGRESS NOTES
# [id number]    GBS pos 2/27/2024  Sees MFM 03/28/2024    +FM x2. No ctx, LOF or VB.  GBS POS --> PCN intrapartum  Cervix 2-3 cm/50/-3, anterior    Discussed delivery plans at length. Plan for delivery at 37w0d (Monday 4/1), discussed IOL for attempted vaginal birth vs. Scheduled CS given twins cephalic/breech. Pt with h/o multiple prior vaginal births and is tested to 8lb7oz. Pt desiring attempt at vaginal delivery, understands that if unable to turn 2nd twin, may require CS of 2nd twin.    I will be out of the office next week, so have reviewed pt with Dr. Smith who is agreeable to help with delivery of this patient. Plans to repeat US on day of hospital admission and determine final delivery plan. Dr. Smith stepped in to meet and speak with patient a bit today.     Labor precautions.    Briseida Medina MD  3/26/2024  11:01 AM

## 2024-03-26 ENCOUNTER — ROUTINE PRENATAL (OUTPATIENT)
Age: 34
End: 2024-03-26
Payer: MEDICAID

## 2024-03-26 VITALS — SYSTOLIC BLOOD PRESSURE: 118 MMHG | WEIGHT: 220 LBS | BODY MASS INDEX: 35.51 KG/M2 | DIASTOLIC BLOOD PRESSURE: 82 MMHG

## 2024-03-26 DIAGNOSIS — Z34.90 PREGNANCY, UNSPECIFIED GESTATIONAL AGE: Primary | ICD-10-CM

## 2024-03-26 PROCEDURE — 59425 ANTEPARTUM CARE ONLY: CPT | Performed by: OBSTETRICS & GYNECOLOGY

## 2024-03-28 ENCOUNTER — ROUTINE PRENATAL (OUTPATIENT)
Age: 34
End: 2024-03-28
Payer: MEDICAID

## 2024-03-28 ENCOUNTER — TELEPHONE (OUTPATIENT)
Age: 34
End: 2024-03-28

## 2024-03-28 VITALS — SYSTOLIC BLOOD PRESSURE: 110 MMHG | DIASTOLIC BLOOD PRESSURE: 67 MMHG | HEART RATE: 62 BPM

## 2024-03-28 DIAGNOSIS — E66.8 OTHER OBESITY AFFECTING PREGNANCY IN THIRD TRIMESTER: ICD-10-CM

## 2024-03-28 DIAGNOSIS — Z78.9 LANGUAGE BARRIER: ICD-10-CM

## 2024-03-28 DIAGNOSIS — Z3A.36 36 WEEKS GESTATION OF PREGNANCY: ICD-10-CM

## 2024-03-28 DIAGNOSIS — O36.5932: ICD-10-CM

## 2024-03-28 DIAGNOSIS — O30.033 MONOCHORIONIC DIAMNIOTIC TWIN GESTATION IN THIRD TRIMESTER: Primary | ICD-10-CM

## 2024-03-28 DIAGNOSIS — O99.213 OTHER OBESITY AFFECTING PREGNANCY IN THIRD TRIMESTER: ICD-10-CM

## 2024-03-28 DIAGNOSIS — O09.523 AMA (ADVANCED MATERNAL AGE) MULTIGRAVIDA 35+, THIRD TRIMESTER: ICD-10-CM

## 2024-03-28 DIAGNOSIS — O36.5931 SGA (SMALL FOR GESTATIONAL AGE), FETAL, AFFECTING CARE OF MOTHER, ANTEPARTUM, THIRD TRIMESTER, FETUS 1: ICD-10-CM

## 2024-03-28 PROCEDURE — 76819 FETAL BIOPHYS PROFIL W/O NST: CPT | Performed by: OBSTETRICS & GYNECOLOGY

## 2024-03-28 PROCEDURE — 76821 MIDDLE CEREBRAL ARTERY ECHO: CPT | Performed by: OBSTETRICS & GYNECOLOGY

## 2024-03-28 PROCEDURE — 76820 UMBILICAL ARTERY ECHO: CPT | Performed by: OBSTETRICS & GYNECOLOGY

## 2024-03-28 PROCEDURE — 76816 OB US FOLLOW-UP PER FETUS: CPT | Performed by: OBSTETRICS & GYNECOLOGY

## 2024-03-28 PROCEDURE — 99213 OFFICE O/P EST LOW 20 MIN: CPT | Performed by: OBSTETRICS & GYNECOLOGY

## 2024-03-28 NOTE — TELEPHONE ENCOUNTER
Attempted to reach the patient via  services to remind the patient to be at the hospital at 0600 tomorrow for IOL;  # [id number] used to conduct phone call. Call restrictions on phone, unable to reach patient and unable to leave a message.

## 2024-03-28 NOTE — TELEPHONE ENCOUNTER
----- Message from Briseida Medina MD sent at 3/28/2024  1:23 PM EDT -----  Regarding: MAYO Zambrano - please contact patient to make sure she knows to go to L&D at 6am tomorrow. thx  ----- Message -----  From: Jack Abdullahi Incoming Transcription  Sent: 3/28/2024  12:07 PM EDT  To: Briseida Medina MD

## 2024-03-28 NOTE — PROCEDURES
artery  Code: 26346  Description: Ultrasound, pregnant uterus, real time with image documentation, follow up, transabdominal approach per fetus  Code: 92464  Description: Ultrasound, pregnant uterus, real time with image documentation, follow up, transabdominal approach per fetus

## 2024-03-28 NOTE — H&P
trimester 2023       No past medical history on file.  No past surgical history on file.  Social History     Socioeconomic History    Marital status: Life Partner    Number of children: 3   Tobacco Use    Smoking status: Never    Smokeless tobacco: Never   Vaping Use    Vaping Use: Never used   Substance and Sexual Activity    Alcohol use: Not Currently    Drug use: Never    Sexual activity: Yes     Partners: Male     No family history on file.    No Known Allergies  Prior to Admission medications    Medication Sig Start Date End Date Taking? Authorizing Provider   Acetaminophen (TYLENOL PO) Take by mouth    Vicki Webster MD   aspirin 81 MG EC tablet Take 1 tablet by mouth daily    Vicki Webster MD   Prenatal Vit w/Ba-Bawxioizt-RY (PNV PO) Take by mouth    Vicki Webster MD        Review of Systems: Pertinent items are noted in HPI.    Objective:     Vitals:  There were no vitals filed for this visit.     Physical Exam:  Patient without distress  Heart: Regular rate and rhythm  Lung: clear to auscultation throughout lung fields, no wheezes, no rales, no rhonchi, and normal respiratory effort  Abdomen: soft, nontender  Perineum: blood absent, amniotic fluid absent  Cervical Exam: 2-3cm in office earlier this week  Lower Extremities: no edema  Membranes:  Intact  Fetal Heart Rate: Reactive x2    Bedside US confirmed cephalic x2    Prenatal Labs:   No components found for: \"OBEXTABORH\", \"OBEXTABSCRN\", \"OBEXTRUBELLA\", \"OBEXTGRBS\", \"OBEXTHBSAG\", \"OBEXTHIV\", \"OBEXTRPR\", \"OBEXTGONORR\", \"OBEXTCHLAM\"     Assessment/Plan:     32 yo  with mono-di twins at 36w4d admitted for IOL for FGR. Cervix was 2-3cm in office on Tues 3/26. GBS POS. H/o TSVD x4. Both twins cephalic.    -plan for IOL, pt understands possible need for CS of 2nd twin, r/b reviewed  -pit --> ROM  -PCN intrapartum for GBS ppx  -CEFM   -CBC, T&S  -consented x2    Signed By:  Briseida Medina MD     2024

## 2024-03-29 ENCOUNTER — HOSPITAL ENCOUNTER (INPATIENT)
Facility: HOSPITAL | Age: 34
LOS: 2 days | Discharge: HOME | DRG: 560 | End: 2024-03-31
Attending: OBSTETRICS & GYNECOLOGY | Admitting: OBSTETRICS & GYNECOLOGY
Payer: MEDICAID

## 2024-03-29 ENCOUNTER — ANESTHESIA (OUTPATIENT)
Facility: HOSPITAL | Age: 34
DRG: 560 | End: 2024-03-29
Payer: MEDICAID

## 2024-03-29 ENCOUNTER — ANESTHESIA EVENT (OUTPATIENT)
Facility: HOSPITAL | Age: 34
DRG: 560 | End: 2024-03-29
Payer: MEDICAID

## 2024-03-29 PROBLEM — O36.5990 FETAL GROWTH RESTRICTION ANTEPARTUM: Status: ACTIVE | Noted: 2024-03-29

## 2024-03-29 LAB
ABO + RH BLD: NORMAL
BLOOD GROUP ANTIBODIES SERPL: NORMAL
ERYTHROCYTE [DISTWIDTH] IN BLOOD BY AUTOMATED COUNT: 13.8 % (ref 11.5–14.5)
HCT VFR BLD AUTO: 30.3 % (ref 35–47)
HGB BLD-MCNC: 9.7 G/DL (ref 11.5–16)
MCH RBC QN AUTO: 25.5 PG (ref 26–34)
MCHC RBC AUTO-ENTMCNC: 32 G/DL (ref 30–36.5)
MCV RBC AUTO: 79.5 FL (ref 80–99)
NRBC # BLD: 0 K/UL (ref 0–0.01)
NRBC BLD-RTO: 0 PER 100 WBC
PLATELET # BLD AUTO: 344 K/UL (ref 150–400)
PMV BLD AUTO: 9.8 FL (ref 8.9–12.9)
RBC # BLD AUTO: 3.81 M/UL (ref 3.8–5.2)
SPECIMEN EXP DATE BLD: NORMAL
WBC # BLD AUTO: 8 K/UL (ref 3.6–11)

## 2024-03-29 PROCEDURE — 85027 COMPLETE CBC AUTOMATED: CPT

## 2024-03-29 PROCEDURE — 36415 COLL VENOUS BLD VENIPUNCTURE: CPT

## 2024-03-29 PROCEDURE — 2580000003 HC RX 258: Performed by: OBSTETRICS & GYNECOLOGY

## 2024-03-29 PROCEDURE — 86901 BLOOD TYPING SEROLOGIC RH(D): CPT

## 2024-03-29 PROCEDURE — 7210000100 HC LABOR FEE PER 1 HR

## 2024-03-29 PROCEDURE — 3700000156 HC EPIDURAL ANESTHESIA

## 2024-03-29 PROCEDURE — 7100000001 HC PACU RECOVERY - ADDTL 15 MIN

## 2024-03-29 PROCEDURE — 6360000002 HC RX W HCPCS: Performed by: ANESTHESIOLOGY

## 2024-03-29 PROCEDURE — 1120000000 HC RM PRIVATE OB

## 2024-03-29 PROCEDURE — 7220000102 HC DELIVERY VAGINAL/MULTIPLE

## 2024-03-29 PROCEDURE — 6370000000 HC RX 637 (ALT 250 FOR IP): Performed by: OBSTETRICS & GYNECOLOGY

## 2024-03-29 PROCEDURE — 3700000025 EPIDURAL BLOCK: Performed by: STUDENT IN AN ORGANIZED HEALTH CARE EDUCATION/TRAINING PROGRAM

## 2024-03-29 PROCEDURE — 4A1H7CZ MONITORING OF PRODUCTS OF CONCEPTION, CARDIAC RATE, VIA NATURAL OR ARTIFICIAL OPENING: ICD-10-PCS | Performed by: OBSTETRICS & GYNECOLOGY

## 2024-03-29 PROCEDURE — 10H073Z INSERTION OF MONITORING ELECTRODE INTO PRODUCTS OF CONCEPTION, VIA NATURAL OR ARTIFICIAL OPENING: ICD-10-PCS | Performed by: OBSTETRICS & GYNECOLOGY

## 2024-03-29 PROCEDURE — 59409 OBSTETRICAL CARE: CPT | Performed by: OBSTETRICS & GYNECOLOGY

## 2024-03-29 PROCEDURE — 86900 BLOOD TYPING SEROLOGIC ABO: CPT

## 2024-03-29 PROCEDURE — 59410 OBSTETRICAL CARE: CPT | Performed by: OBSTETRICS & GYNECOLOGY

## 2024-03-29 PROCEDURE — 00HU33Z INSERTION OF INFUSION DEVICE INTO SPINAL CANAL, PERCUTANEOUS APPROACH: ICD-10-PCS | Performed by: ANESTHESIOLOGY

## 2024-03-29 PROCEDURE — 7100000000 HC PACU RECOVERY - FIRST 15 MIN

## 2024-03-29 PROCEDURE — 86850 RBC ANTIBODY SCREEN: CPT

## 2024-03-29 PROCEDURE — 2500000003 HC RX 250 WO HCPCS

## 2024-03-29 PROCEDURE — 6360000002 HC RX W HCPCS: Performed by: OBSTETRICS & GYNECOLOGY

## 2024-03-29 PROCEDURE — 2500000003 HC RX 250 WO HCPCS: Performed by: ANESTHESIOLOGY

## 2024-03-29 PROCEDURE — 51701 INSERT BLADDER CATHETER: CPT

## 2024-03-29 RX ORDER — BUPIVACAINE HYDROCHLORIDE 2.5 MG/ML
INJECTION, SOLUTION EPIDURAL; INFILTRATION; INTRACAUDAL PRN
Status: DISCONTINUED | OUTPATIENT
Start: 2024-03-29 | End: 2024-03-29 | Stop reason: SDUPTHER

## 2024-03-29 RX ORDER — SODIUM CHLORIDE 9 MG/ML
25 INJECTION, SOLUTION INTRAVENOUS PRN
Status: DISCONTINUED | OUTPATIENT
Start: 2024-03-29 | End: 2024-03-31 | Stop reason: HOSPADM

## 2024-03-29 RX ORDER — DOCUSATE SODIUM 100 MG/1
100 CAPSULE, LIQUID FILLED ORAL 2 TIMES DAILY
Status: DISCONTINUED | OUTPATIENT
Start: 2024-03-29 | End: 2024-03-31 | Stop reason: HOSPADM

## 2024-03-29 RX ORDER — BUPIVACAINE HYDROCHLORIDE 2.5 MG/ML
INJECTION, SOLUTION EPIDURAL; INFILTRATION; INTRACAUDAL
Status: COMPLETED
Start: 2024-03-29 | End: 2024-03-29

## 2024-03-29 RX ORDER — ONDANSETRON 4 MG/1
4 TABLET, ORALLY DISINTEGRATING ORAL EVERY 6 HOURS PRN
Status: DISCONTINUED | OUTPATIENT
Start: 2024-03-29 | End: 2024-03-31 | Stop reason: HOSPADM

## 2024-03-29 RX ORDER — LIDOCAINE HYDROCHLORIDE AND EPINEPHRINE 15; 5 MG/ML; UG/ML
INJECTION, SOLUTION EPIDURAL
Status: DISPENSED
Start: 2024-03-29 | End: 2024-03-30

## 2024-03-29 RX ORDER — SODIUM CHLORIDE 0.9 % (FLUSH) 0.9 %
5-40 SYRINGE (ML) INJECTION EVERY 12 HOURS SCHEDULED
Status: DISCONTINUED | OUTPATIENT
Start: 2024-03-29 | End: 2024-03-31 | Stop reason: HOSPADM

## 2024-03-29 RX ORDER — DIPHENHYDRAMINE HYDROCHLORIDE 50 MG/ML
12.5 INJECTION INTRAMUSCULAR; INTRAVENOUS EVERY 4 HOURS PRN
Status: DISCONTINUED | OUTPATIENT
Start: 2024-03-29 | End: 2024-03-29 | Stop reason: HOSPADM

## 2024-03-29 RX ORDER — METHYLERGONOVINE MALEATE 0.2 MG/ML
200 INJECTION INTRAVENOUS PRN
Status: DISCONTINUED | OUTPATIENT
Start: 2024-03-29 | End: 2024-03-31 | Stop reason: HOSPADM

## 2024-03-29 RX ORDER — MISOPROSTOL 200 UG/1
400 TABLET ORAL PRN
Status: DISCONTINUED | OUTPATIENT
Start: 2024-03-29 | End: 2024-03-31 | Stop reason: HOSPADM

## 2024-03-29 RX ORDER — FENTANYL CITRATE 50 UG/ML
25 INJECTION, SOLUTION INTRAMUSCULAR; INTRAVENOUS
Status: DISCONTINUED | OUTPATIENT
Start: 2024-03-29 | End: 2024-03-31 | Stop reason: HOSPADM

## 2024-03-29 RX ORDER — ONDANSETRON 4 MG/1
8 TABLET, ORALLY DISINTEGRATING ORAL EVERY 8 HOURS PRN
Status: DISCONTINUED | OUTPATIENT
Start: 2024-03-29 | End: 2024-03-31 | Stop reason: HOSPADM

## 2024-03-29 RX ORDER — SODIUM CHLORIDE 0.9 % (FLUSH) 0.9 %
5-40 SYRINGE (ML) INJECTION PRN
Status: DISCONTINUED | OUTPATIENT
Start: 2024-03-29 | End: 2024-03-31 | Stop reason: HOSPADM

## 2024-03-29 RX ORDER — IBUPROFEN 400 MG/1
800 TABLET ORAL EVERY 8 HOURS SCHEDULED
Status: DISCONTINUED | OUTPATIENT
Start: 2024-03-29 | End: 2024-03-31 | Stop reason: HOSPADM

## 2024-03-29 RX ORDER — SODIUM CHLORIDE, SODIUM LACTATE, POTASSIUM CHLORIDE, AND CALCIUM CHLORIDE .6; .31; .03; .02 G/100ML; G/100ML; G/100ML; G/100ML
1000 INJECTION, SOLUTION INTRAVENOUS PRN
Status: DISCONTINUED | OUTPATIENT
Start: 2024-03-29 | End: 2024-03-31 | Stop reason: HOSPADM

## 2024-03-29 RX ORDER — CARBOPROST TROMETHAMINE 250 UG/ML
250 INJECTION, SOLUTION INTRAMUSCULAR PRN
Status: DISCONTINUED | OUTPATIENT
Start: 2024-03-29 | End: 2024-03-31 | Stop reason: HOSPADM

## 2024-03-29 RX ORDER — SODIUM CHLORIDE 9 MG/ML
INJECTION, SOLUTION INTRAVENOUS PRN
Status: DISCONTINUED | OUTPATIENT
Start: 2024-03-29 | End: 2024-03-31 | Stop reason: HOSPADM

## 2024-03-29 RX ORDER — TERBUTALINE SULFATE 1 MG/ML
0.25 INJECTION, SOLUTION SUBCUTANEOUS
Status: ACTIVE | OUTPATIENT
Start: 2024-03-29 | End: 2024-03-30

## 2024-03-29 RX ORDER — ONDANSETRON 2 MG/ML
4 INJECTION INTRAMUSCULAR; INTRAVENOUS EVERY 6 HOURS PRN
Status: DISCONTINUED | OUTPATIENT
Start: 2024-03-29 | End: 2024-03-31 | Stop reason: HOSPADM

## 2024-03-29 RX ORDER — SODIUM CHLORIDE, SODIUM LACTATE, POTASSIUM CHLORIDE, CALCIUM CHLORIDE 600; 310; 30; 20 MG/100ML; MG/100ML; MG/100ML; MG/100ML
INJECTION, SOLUTION INTRAVENOUS CONTINUOUS
Status: DISCONTINUED | OUTPATIENT
Start: 2024-03-29 | End: 2024-03-31 | Stop reason: HOSPADM

## 2024-03-29 RX ORDER — EPHEDRINE SULFATE 50 MG/ML
INJECTION INTRAVENOUS
Status: DISCONTINUED
Start: 2024-03-29 | End: 2024-03-29 | Stop reason: WASHOUT

## 2024-03-29 RX ORDER — SODIUM CHLORIDE, SODIUM LACTATE, POTASSIUM CHLORIDE, AND CALCIUM CHLORIDE .6; .31; .03; .02 G/100ML; G/100ML; G/100ML; G/100ML
500 INJECTION, SOLUTION INTRAVENOUS PRN
Status: DISCONTINUED | OUTPATIENT
Start: 2024-03-29 | End: 2024-03-31 | Stop reason: HOSPADM

## 2024-03-29 RX ORDER — FENTANYL CITRATE 50 UG/ML
INJECTION, SOLUTION INTRAMUSCULAR; INTRAVENOUS PRN
Status: DISCONTINUED | OUTPATIENT
Start: 2024-03-29 | End: 2024-03-29 | Stop reason: SDUPTHER

## 2024-03-29 RX ORDER — NALOXONE HYDROCHLORIDE 0.4 MG/ML
INJECTION, SOLUTION INTRAMUSCULAR; INTRAVENOUS; SUBCUTANEOUS PRN
Status: DISCONTINUED | OUTPATIENT
Start: 2024-03-29 | End: 2024-03-29 | Stop reason: HOSPADM

## 2024-03-29 RX ORDER — FENTANYL/BUPIVACAINE/NS/PF 2-1250MCG
PLASTIC BAG, INJECTION (ML) INJECTION
Status: COMPLETED
Start: 2024-03-29 | End: 2024-03-29

## 2024-03-29 RX ORDER — MODIFIED LANOLIN
OINTMENT (GRAM) TOPICAL PRN
Status: DISCONTINUED | OUTPATIENT
Start: 2024-03-29 | End: 2024-03-31 | Stop reason: HOSPADM

## 2024-03-29 RX ORDER — BUPIVACAINE HYDROCHLORIDE 2.5 MG/ML
INJECTION, SOLUTION EPIDURAL; INFILTRATION; INTRACAUDAL
Status: DISPENSED
Start: 2024-03-29 | End: 2024-03-30

## 2024-03-29 RX ORDER — DIPHENHYDRAMINE HYDROCHLORIDE 50 MG/ML
12.5 INJECTION INTRAMUSCULAR; INTRAVENOUS EVERY 4 HOURS PRN
Status: DISCONTINUED | OUTPATIENT
Start: 2024-03-29 | End: 2024-03-31 | Stop reason: HOSPADM

## 2024-03-29 RX ORDER — ACETAMINOPHEN 500 MG
1000 TABLET ORAL EVERY 8 HOURS SCHEDULED
Status: DISCONTINUED | OUTPATIENT
Start: 2024-03-29 | End: 2024-03-31 | Stop reason: HOSPADM

## 2024-03-29 RX ORDER — FENTANYL/BUPIVACAINE/NS/PF 2-1250MCG
1-15 PLASTIC BAG, INJECTION (ML) INJECTION CONTINUOUS
Status: DISCONTINUED | OUTPATIENT
Start: 2024-03-29 | End: 2024-03-29 | Stop reason: HOSPADM

## 2024-03-29 RX ORDER — LIDOCAINE HCL/EPINEPHRINE/PF 2%-1:200K
VIAL (ML) INJECTION PRN
Status: DISCONTINUED | OUTPATIENT
Start: 2024-03-29 | End: 2024-03-29 | Stop reason: SDUPTHER

## 2024-03-29 RX ORDER — ONDANSETRON 2 MG/ML
4 INJECTION INTRAMUSCULAR; INTRAVENOUS EVERY 6 HOURS PRN
Status: DISCONTINUED | OUTPATIENT
Start: 2024-03-29 | End: 2024-03-29 | Stop reason: HOSPADM

## 2024-03-29 RX ADMIN — FENTANYL CITRATE 100 MCG: 50 INJECTION, SOLUTION INTRAMUSCULAR; INTRAVENOUS at 14:54

## 2024-03-29 RX ADMIN — Medication 907 MILLI-UNITS/MIN: at 18:50

## 2024-03-29 RX ADMIN — Medication 87.3 MILLI-UNITS/MIN: at 20:24

## 2024-03-29 RX ADMIN — OXYTOCIN 1 MILLI-UNITS/MIN: 10 INJECTION, SOLUTION INTRAMUSCULAR; INTRAVENOUS at 07:00

## 2024-03-29 RX ADMIN — SODIUM CHLORIDE, POTASSIUM CHLORIDE, SODIUM LACTATE AND CALCIUM CHLORIDE: 600; 310; 30; 20 INJECTION, SOLUTION INTRAVENOUS at 06:47

## 2024-03-29 RX ADMIN — LIDOCAINE HYDROCHLORIDE AND EPINEPHRINE 3 ML: 20; 5 INJECTION, SOLUTION EPIDURAL; INFILTRATION; INTRACAUDAL; PERINEURAL at 14:54

## 2024-03-29 RX ADMIN — BUPIVACAINE HYDROCHLORIDE 5 ML: 2.5 INJECTION, SOLUTION EPIDURAL; INFILTRATION; INTRACAUDAL; PERINEURAL at 14:54

## 2024-03-29 RX ADMIN — SODIUM CHLORIDE 2.5 MILLION UNITS: 9 INJECTION, SOLUTION INTRAVENOUS at 11:28

## 2024-03-29 RX ADMIN — Medication 10 ML/HR: at 17:31

## 2024-03-29 RX ADMIN — IBUPROFEN 800 MG: 400 TABLET, FILM COATED ORAL at 20:53

## 2024-03-29 RX ADMIN — SODIUM CHLORIDE 5 MILLION UNITS: 900 INJECTION INTRAVENOUS at 07:02

## 2024-03-29 RX ADMIN — SODIUM CHLORIDE 2.5 MILLION UNITS: 9 INJECTION, SOLUTION INTRAVENOUS at 16:03

## 2024-03-29 RX ADMIN — ACETAMINOPHEN 1000 MG: 500 TABLET ORAL at 23:04

## 2024-03-29 RX ADMIN — MISOPROSTOL 400 MCG: 200 TABLET ORAL at 18:54

## 2024-03-29 ASSESSMENT — PAIN SCALES - GENERAL: PAINLEVEL_OUTOF10: 3

## 2024-03-29 ASSESSMENT — PAIN DESCRIPTION - DESCRIPTORS: DESCRIPTORS: CRAMPING

## 2024-03-29 ASSESSMENT — PAIN - FUNCTIONAL ASSESSMENT: PAIN_FUNCTIONAL_ASSESSMENT: ACTIVITIES ARE NOT PREVENTED

## 2024-03-29 ASSESSMENT — PAIN DESCRIPTION - LOCATION: LOCATION: ABDOMEN

## 2024-03-29 NOTE — PROGRESS NOTES
1542 Bedside and Verbal shift change report given to BUCK Oconnell RN (oncoming nurse) by SHANELLE Meier RN (offgoing nurse). Report included the following information Nurse Handoff Report, Recent Results, and Event Log.   1600 Dr Medina in to see patient. Viewing strip. SVE by her 5/50/-2  1710 Complaining of pain with contraction. Noted that PCEA was not connected. Anesthesia notified  1725 Dr Schmidt in dosing epidural.  1731 Epidural pump initiated  1745 Dr Medina in to evaluate. Viewing strip. SVE by her 6/50/-1 . Pt denies pain  1750 Straight cathed for 200 ml conc urine  1823 Deep variable decel noted. Dr Medina in. SVE by her 8/70/0. To move to OR two and prepare for vaginal delivery  1827 Deep variable decels twin A. Variable twin B as well. To OR 2 for delivery  1830 In OR  1835 Dr Medina remains at bedside. SVE by her 10/100/+3  1837 Instruction given and starting to push in semifowlers  1841 Twin A delivered Vigorous   1844 Twin D Delivered Vigorous  1849 Placenta delivered and cord blood obtained for each  1850 Pitocin started at 909 ml/ hour per protocol  1854 Cytotec 400 buccal  1902 Pitocin decreased to 97.3  1910 Pt returned to LDR 11  1915 Pt shaking. Difficult registering blood pressure X 3 tries. Cuff placed on leg  1928. Bedside and Verbal shift change report given to CHAVEZ Salazar RN (oncoming nurse) by BUCK Oconnell RN (offgoing nurse). Report included the following information Nurse Handoff Report, Recent Results, and Event Log.

## 2024-03-29 NOTE — PROGRESS NOTES
1830 pt into OR 2 for delivery of twins. NICU and anesthesia both notified.  1835 pt is complete.   1837 pushing started.

## 2024-03-29 NOTE — ANESTHESIA PRE PROCEDURE
Department of Anesthesiology  Preprocedure Note       Name:  Nano Canales   Age:  33 y.o.  :  1990                                          MRN:  [id number]         Date:  3/29/2024      Surgeon: * No surgeons listed *    Procedure: * No procedures listed *    Medications prior to admission:   Prior to Admission medications    Medication Sig Start Date End Date Taking? Authorizing Provider   Acetaminophen (TYLENOL PO) Take by mouth  Patient not taking: Reported on 3/29/2024    Vicki Webster MD   aspirin 81 MG EC tablet Take 1 tablet by mouth daily    Vicki Webster MD   Prenatal Vit w/Af-Mvfpfbuye-TI (PNV PO) Take by mouth    Vicki Webster MD       Current medications:    Current Facility-Administered Medications   Medication Dose Route Frequency Provider Last Rate Last Admin   • lactated ringers IV soln infusion   IntraVENous Continuous Briseida Medina  mL/hr at 24 1334 Rate Verify at 24 1334   • lactated ringers bolus 500 mL  500 mL IntraVENous Briseida Weaver MD        Or   • lactated ringers bolus 1,000 mL  1,000 mL IntraVENous PRBriseida Martinez MD       • sodium chloride flush 0.9 % injection 5-40 mL  5-40 mL IntraVENous 2 times per day Briseida Medina MD       • sodium chloride flush 0.9 % injection 5-40 mL  5-40 mL IntraVENous PRBriseida Martinez MD       • 0.9 % sodium chloride infusion  25 mL IntraVENous Briseida Weaver MD       • oxytocin (PITOCIN) 30 units in 500 mL infusion  1-20 sarah-units/min IntraVENous Continuous Birseida Medina MD 18 mL/hr at 24 1420 18 sarah-units/min at 24 1420   • methylergonovine (METHERGINE) injection 200 mcg  200 mcg IntraMUSCular Briseida Weaver MD       • carboprost (HEMABATE) injection 250 mcg  250 mcg IntraMUSCular Briseida Weaver MD       • miSOPROStol (CYTOTEC) tablet 400 mcg  400 mcg Buccal PRBriseida Martinez MD       • tranexamic acid

## 2024-03-29 NOTE — ANESTHESIA PROCEDURE NOTES
Epidural Block    Patient location during procedure: OB  Reason for block: labor epidural  Staffing  Performed: anesthesiologist   Performed by: Pat Campos DO  Authorized by: Pat Campos DO    Epidural  Patient position: sitting  Prep: ChloraPrep and site prepped and draped  Patient monitoring: continuous pulse ox and frequent blood pressure checks  Approach: midline  Location: L3-4  Injection technique: ROSALIND air  Provider prep: mask and sterile gown  Needle  Needle type: Tuohy   Needle gauge: 17 G  Needle insertion depth: 5 cm  Catheter type: end hole  Catheter size: 19 G  Catheter at skin depth: 10 cm  Test dose: negativeCatheter Secured: tegaderm and tape  Assessment  Hemodynamics: stable  Attempts: 1  Outcomes: uncomplicated and patient tolerated procedure well  Preanesthetic Checklist  Completed: patient identified, IV checked, risks and benefits discussed, surgical/procedural consents, pre-op evaluation, timeout performed, anesthesia consent given, oxygen available and monitors applied/VS acknowledged

## 2024-03-29 NOTE — PROGRESS NOTES
0998 Bedside and verbal SBAR report received from DELBERT Frazier RN    1045 RN at bedside. Pt using bedside commode.     1049 pt attempting to sit on birthing ball on bedside. RN unable to continuously monitor FHTs d/t pt position    1058 pt back to semi fowlers in bed. RN adjusting EFM.    1310 pt up to bedside commode    1415 pt requesting epidural at this time. IV fluid bolus begins    1430 MD Christina notified of epidural request. Pt up to bedside commode    1443 MD Andres at bedside. Pt positioned for epidural and time out performed    1451 pt assisted to left lateral side. Tolerated procedure well    1520 pt assisted to right lateral side with peanut ball    1540 Verbal SBAR report given to RO Oconnell RN

## 2024-03-29 NOTE — PROGRESS NOTES
3/29/2024  5:57 AM  Received to LDR 11 for induction of labor    [id number] Assessment obtained through hospital     0745 Dr Medina at bedside. Bedside ultrasound done- VTX/VTX

## 2024-03-29 NOTE — PROGRESS NOTES
Cervix 4/50/-3, amniotomy performed clear fluid, both babies tolerated well.     FSE placed due to difficulty monitoring Twin A.     Pit at 14, continue to titrate.    Epidural prn.    OR for delivery, will request 2nd MD present.    Patient Vitals for the past 24 hrs:   BP Temp Temp src Pulse Resp SpO2 Height Weight   03/29/24 1105 115/68 98.3 °F (36.8 °C) Oral 70 17 -- -- --   03/29/24 0958 118/69 -- -- 55 18 -- -- --   03/29/24 0951 118/69 -- -- 55 -- -- -- --   03/29/24 0900 122/67 98.6 °F (37 °C) Oral 76 18 -- -- --   03/29/24 0800 116/67 -- -- 78 16 -- -- --   03/29/24 0700 121/67 -- -- 76 16 -- -- --   03/29/24 0618 113/78 98.1 °F (36.7 °C) Oral 93 16 98 % 1.676 m (5' 6\") 99.8 kg (220 lb)       Results for orders placed or performed during the hospital encounter of 03/29/24   GBS, External Result   Result Value Ref Range    GBS, External Result positive    C. Trachomatis, External Result   Result Value Ref Range    C. Trachomatis, External Result negative    N. Gonorrhoeae, External Result   Result Value Ref Range    N. Gonorrhoeae, External Result negative    CBC   Result Value Ref Range    WBC 8.0 3.6 - 11.0 K/uL    RBC 3.81 3.80 - 5.20 M/uL    Hemoglobin 9.7 (L) 11.5 - 16.0 g/dL    Hematocrit 30.3 (L) 35.0 - 47.0 %    MCV 79.5 (L) 80.0 - 99.0 FL    MCH 25.5 (L) 26.0 - 34.0 PG    MCHC 32.0 30.0 - 36.5 g/dL    RDW 13.8 11.5 - 14.5 %    Platelets 344 150 - 400 K/uL    MPV 9.8 8.9 - 12.9 FL    Nucleated RBCs 0.0 0  WBC    nRBC 0.00 0.00 - 0.01 K/uL   Hepatitis C Antibody, External Result   Result Value Ref Range    Hepatitis C Antibody, External Result negative    HIV, External Result   Result Value Ref Range    HIV, External Result negative    RPR, External Lab   Result Value Ref Range    RPR, External Result non reactive    Hepatitis B, External Result   Result Value Ref Range    Hep B, External Result negative    Rubella Titer, External Result   Result Value Ref Range    Rubella Titer, External

## 2024-03-29 NOTE — L&D DELIVERY NOTE
Minute:    1    2    2    2    2    9                                        Apgars Assigned By: KAMALA              Resuscitation    Method: Bulb Suction, Stimulation              Measurements               Skin to Skin      Skin to Skin Initiation Date/Time: 3/29/24 19:01:35 EDT     Skin to Skin With: Mother                  RED Vazquez [[id number]]      Labor Events     Labor: Yes   Steroids: None  Cervical Ripening Date/Time:      Antibiotics Received during Labor: Yes  Rupture Identifier: Sac 1  Rupture Date/Time:  3/29/24 12:05:00   Rupture Type: AROM  Fluid Color: Clear  Fluid Odor: None  Induction: Oxytocin  Augmentation: AROM              Anesthesia    Method: Epidural       Labor Event Times      Labor onset date/time:  3/29/24 12:30:00     Dilation complete date/time:  3/29/24 18:35:00     Start pushing date/time:     Decision date/time (emergent ):            Delivery Details      Delivery Date: 3/29/24 Delivery Time: 18:44:00   Delivery Type: Vaginal, Spontaneous               Presentation    Presentation: Vertex       Shoulder Dystocia    Shoulder Dystocia Present?: No       Assisted Delivery Details    Forceps Attempted?: No  Vacuum Extractor Attempted?: No                           Cord    Vessels: 3 Vessels  Complications: None  Delayed Cord Clamping?: Yes  Cord Clamped Date/Time: 3/29/2024 18:45:00  Cord Blood Disposition: Lab  Gases Sent?: No              Placenta    Date/Time: 3/29/2024 18:49:00  Removal: Spontaneous  Appearance: Intact  Disposition: Discarded       Lacerations           Blood Loss  Mother: Nano Vazquez #[id number]     Start of Mother's Information      Delivery Blood Loss  24 1230 - 24 1907      None                 End of Mother's Information  Mother: Nano Vazquez #[id number]                Delivery Providers    Delivering clinician: Briseida Medina MD     Provider Role

## 2024-03-30 PROCEDURE — 6370000000 HC RX 637 (ALT 250 FOR IP): Performed by: OBSTETRICS & GYNECOLOGY

## 2024-03-30 PROCEDURE — 1120000000 HC RM PRIVATE OB

## 2024-03-30 RX ADMIN — ACETAMINOPHEN 1000 MG: 500 TABLET ORAL at 16:06

## 2024-03-30 RX ADMIN — IBUPROFEN 800 MG: 400 TABLET, FILM COATED ORAL at 16:05

## 2024-03-30 RX ADMIN — IBUPROFEN 800 MG: 400 TABLET, FILM COATED ORAL at 07:08

## 2024-03-30 RX ADMIN — DOCUSATE SODIUM 100 MG: 100 CAPSULE, LIQUID FILLED ORAL at 07:08

## 2024-03-30 RX ADMIN — ACETAMINOPHEN 1000 MG: 500 TABLET ORAL at 07:08

## 2024-03-30 RX ADMIN — DOCUSATE SODIUM 100 MG: 100 CAPSULE, LIQUID FILLED ORAL at 21:09

## 2024-03-30 ASSESSMENT — PAIN SCALES - GENERAL: PAINLEVEL_OUTOF10: 3

## 2024-03-30 ASSESSMENT — PAIN - FUNCTIONAL ASSESSMENT: PAIN_FUNCTIONAL_ASSESSMENT: ACTIVITIES ARE NOT PREVENTED

## 2024-03-30 ASSESSMENT — PAIN DESCRIPTION - LOCATION: LOCATION: ABDOMEN

## 2024-03-30 ASSESSMENT — PAIN DESCRIPTION - DESCRIPTORS: DESCRIPTORS: CRAMPING

## 2024-03-30 NOTE — PROGRESS NOTES
1930: OB SBAR received from BUCK Oconnell RN.    2000: patient shaking, difficult to obtain BP.  Warm blankets given.     2130: TRANSFER - OUT REPORT:    Verbal report given to Liz GARG on Nano Canales  being transferred to [facility]U for routine progression of patient care       Report consisted of patient's Situation, Background, Assessment and   Recommendations(SBAR).     Information from the following report(s) Nurse Handoff Report, Intake/Output, MAR, and Recent Results was reviewed with the receiving nurse.           Lines:   Peripheral IV 03/29/24 Posterior;Right Hand (Active)        Opportunity for questions and clarification was provided.      Patient transported with:  Registered Nurse

## 2024-03-30 NOTE — DISCHARGE INSTRUCTIONS
Postpartum Support Groups   We know that all of us are dealing with a tremendous amount of uncertainty, confusion and disruption to our daily lives, which may result in increased anxiety, depression and fear. If you are feeling unsettled or worse, please know that we are here to help. During this time of increased caution and care for one another, [facility] [place] ([facility]) is offering virtual and in person support groups to ALL MOTHERS in [place] regardless of the age of your child/children as a way to help weather this emotional storm together. Social support is an important part of self-care during this time of physical distancing.  Virtual postpartum support group meetings available at www.postpartumva.org  Warm Line: 140.266.3210    Breastfeeding Support Groups   1st and 3rd Wednesday of each month at [facility]  2nd and 4th Tuesday of each month at [facility] (in education center behind cafeteria)    [email or web address]       Learning About Preeclampsia After Childbirth  What is preeclampsia?     Preeclampsia is high blood pressure and signs of organ damage, such as protein in the urine, usually after 20 weeks of pregnancy. If it's not treated, preeclampsia can harm you or your baby.  Severe preeclampsia can lead to dangerous seizures (eclampsia). When preeclampsia affects the liver, it can cause HELLP syndrome, a blood-clotting and bleeding problem. HELLP can come on quickly and can be dangerous. This is why your doctor checks you and your baby often.  Preeclampsia usually goes away after the baby is born. But symptoms may last or get worse after delivery. In rare cases, symptoms may not show up until days or even weeks after childbirth.  What are the symptoms?  Mild preeclampsia usually doesn't cause symptoms. But it may cause rapid weight gain and sudden swelling of the hands and face. Severe preeclampsia can cause symptoms such as a severe

## 2024-03-30 NOTE — LACTATION NOTE
This note was copied from a baby's chart.  Twin girl infants born vaginally last evening to a  mom at 36 5/7 weeks gestation. Mom has been predominantly feeding formula. She states she nursed her other children with adequate supply. Per mom, she has been feeding infants every 3 hours but twin A has not been eating very well as she is sleepy. Both infants received baths this morning. At the time of my visit, I awakened twin A and fed 16 ml of formula. I assisted mom with latching twin B to the breast. Infant doesn;t open very wide and mom;s nipple is large. Encouraged mom to make sure infant latches to more than just the nipple. Mom has easily expressed colostrum. Due to infants' lack of vigor at breast, I suggest she alternate feedings at breast between babies so that at least every other feeding, they are receiving formula. Once infants are more consistent with latching, she can work on tandem feeds. Stressed the importance of consistent feeds at breast and that mom should call for assistance as needed with waking infants and feeding.

## 2024-03-30 NOTE — PROGRESS NOTES
Post-Partum Day Number 1 Progress Note      Patient doing well post-partum without significant complaint.  She is voiding without difficulty, she reports normal lochia. She is ambulatiing without dizziness.  Her pain is well controlled with oral pain medication. She is tolerating general diet.      Vitals:  Patient Vitals for the past 8 hrs:   BP Temp Temp src Pulse Resp SpO2   24 0545 101/64 98 °F (36.7 °C) Oral 52 16 98 %     Temp (24hrs), Av.5 °F (36.9 °C), Min:98 °F (36.7 °C), Max:99.7 °F (37.6 °C)        Exam:  Patient without distress.                          Abdomen soft, nontender, nondistended, normal bowel sounds               Uterus: fundus firm at level of umbilicus, nontender               Lower extremities are negative for cords or tenderness, no swelling.    Labs: No results found for this or any previous visit (from the past 24 hour(s)).    No components found for: \"OBEXTABORH\", \"OBEXTABSCRN\", \"OBEXTRUBELLA\", \"OBEXTGRBS\", \"OBEXTHBSAG\", \"OBEXTHIV\", \"OBEXTRPR\", \"OBEXTGONORR\", \"OBEXTCHLAM\"    Assessment and Plan:   Postpartum Day #1 S/P .  Doing well.   - routine care   - anticipate discharge in AM

## 2024-03-31 VITALS
HEART RATE: 51 BPM | SYSTOLIC BLOOD PRESSURE: 124 MMHG | OXYGEN SATURATION: 99 % | RESPIRATION RATE: 16 BRPM | BODY MASS INDEX: 35.36 KG/M2 | WEIGHT: 220 LBS | TEMPERATURE: 98.2 F | DIASTOLIC BLOOD PRESSURE: 75 MMHG | HEIGHT: 66 IN

## 2024-03-31 PROCEDURE — 6370000000 HC RX 637 (ALT 250 FOR IP): Performed by: OBSTETRICS & GYNECOLOGY

## 2024-03-31 RX ORDER — IBUPROFEN 800 MG/1
800 TABLET ORAL EVERY 8 HOURS SCHEDULED
Qty: 120 TABLET | Refills: 3 | Status: SHIPPED | OUTPATIENT
Start: 2024-03-31

## 2024-03-31 RX ORDER — MODIFIED LANOLIN
1 OINTMENT (GRAM) TOPICAL PRN
Qty: 1 EACH | Refills: 0 | Status: SHIPPED
Start: 2024-03-31

## 2024-03-31 RX ADMIN — IBUPROFEN 800 MG: 400 TABLET, FILM COATED ORAL at 00:10

## 2024-03-31 RX ADMIN — ACETAMINOPHEN 1000 MG: 500 TABLET ORAL at 00:10

## 2024-03-31 RX ADMIN — ACETAMINOPHEN 1000 MG: 500 TABLET ORAL at 10:45

## 2024-03-31 RX ADMIN — IBUPROFEN 800 MG: 400 TABLET, FILM COATED ORAL at 10:44

## 2024-03-31 NOTE — PROGRESS NOTES
Post-Partum Day Number 2 Progress/Discharge Note    Patient doing well post-partum without significant complaint.  She is voiding without difficulty, she reports normal lochia. She is ambulatiing without dizziness.  Her pain is well controlled with oral pain medication. She is tolerating general diet.    Vitals:  Patient Vitals for the past 8 hrs:   BP Temp Temp src Pulse Resp SpO2   24 0330 106/77 98 °F (36.7 °C) Oral 81 16 97 %     Temp (24hrs), Av °F (36.7 °C), Min:97.7 °F (36.5 °C), Max:98.2 °F (36.8 °C)        Exam:  Patient without distress.                            Abdomen soft, fundus firm at level of umbilicus, nontender               Lower extremities are negative for cords or tenderness; +1 swelling.    Labs: No results found for this or any previous visit (from the past 24 hour(s)).    No components found for: \"OBEXTABORH\", \"OBEXTABSCRN\", \"OBEXTRUBELLA\", \"OBEXTGRBS\", \"OBEXTHBSAG\", \"OBEXTHIV\", \"OBEXTRPR\", \"OBEXTGONORR\", \"OBEXTCHLAM\"    Assessment and Plan:    Postpartum Day #2, S/P .  Doing well.   - d/c home   - F/U 6wk postpartum check, sooner prn

## 2024-03-31 NOTE — LACTATION NOTE
This note was copied from a baby's chart.  Mom and twins being discharged this morning. Mom states the twins have been latching and nursing and she has been supplementing with formula after nursing. She said she breast fed her other children and she has no questions for lactation before discharge.     Mom is [place] speaking and she wanted a family member to interpret for her.

## 2024-03-31 NOTE — DISCHARGE SUMMARY
Date: 03/29/2024  Value: 32.0        Ref range: 30.0 - 36.5 g/dL   Status: Final  RDW                                           Date: 03/29/2024  Value: 13.8        Ref range: 11.5 - 14.5 %      Status: Final  Platelets                                     Date: 03/29/2024  Value: 344         Ref range: 150 - 400 K/uL     Status: Final  MPV                                           Date: 03/29/2024  Value: 9.8         Ref range: 8.9 - 12.9 FL      Status: Final  Nucleated RBCs                                Date: 03/29/2024  Value: 0.0         Ref range: 0  WBC      Status: Final  nRBC                                          Date: 03/29/2024  Value: 0.00        Ref range: 0.00 - 0.01 K/uL   Status: Final  Crossmatch expiration date                    Date: 03/29/2024  Value: 04/01/2024,2359                       Status: Final  ABO/Rh                                        Date: 03/29/2024  Value: O POSITIVE    Status: Final  Antibody Screen                               Date: 03/29/2024  Value: NEG           Status: Final  T. Pallidum (Syphilis) Antibody, E*           Date: 01/29/2024  Value: non reactive                       Status: Final  GBS, External Result                          Date: 02/27/2024  Value: positive      Status: Final  Hepatitis C Antibody, External Res*           Date: 12/08/2023  Value: negative      Status: Final  Rh Factor, External Result                    Date: 12/08/2023  Value: positive      Status: Final  HIV, External Result                          Date: 12/08/2023  Value: negative      Status: Final  RPR, External Result                          Date: 12/08/2023  Value: non reactive                       Status: Final  ABO, External Result                          Date: 12/08/2023  Value: O             Status: Final  Hep B, External Result                        Date: 12/08/2023  Value: negative      Status: Final  Rubella Titer, External Result                Date:

## 2024-04-01 NOTE — ANESTHESIA POSTPROCEDURE EVALUATION
Department of Anesthesiology  Postprocedure Note    Patient: Nano Canales  MRN: [id number]  YOB: 1990  Date of evaluation: 4/1/2024    Procedure Summary       Date: 03/29/24 Room / Location:     Anesthesia Start: 1453 Anesthesia Stop: 1844    Procedure: Labor Analgesia Diagnosis:     Scheduled Providers:  Responsible Provider: Maria Luz Schmidt MD    Anesthesia Type: epidural ASA Status: 2            Anesthesia Type: No value filed.    Manuel Phase I:      Manuel Phase II:      Anesthesia Post Evaluation    Patient location during evaluation: PACU  Level of consciousness: awake  Airway patency: patent  Nausea & Vomiting: no nausea  Cardiovascular status: hemodynamically stable  Respiratory status: acceptable  Hydration status: stable  Comments: There were no vitals filed for this visit.   Multimodal analgesia pain management approach        No notable events documented.

## 2024-04-04 ENCOUNTER — TELEPHONE (OUTPATIENT)
Age: 34
End: 2024-04-04

## 2024-04-04 NOTE — TELEPHONE ENCOUNTER
I tried Multiple time to reach the patient to set up her Post Partum visit! I was not able to reach her nor leave a voicemail.

## 2024-05-16 NOTE — PROGRESS NOTES
Chief Complaint   Patient presents with    Postpartum Care      services #: [id number]  Nano Radha Canales is a 33 y.o. female returns for a routine post-partum follow-up visit.     S/p  24 'Thomas'    Postpartum Depression: Low Risk  (3/31/2024)    [place]  Depression Scale     Last EPDS Total Score: 2     Last EPDS Self Harm Result: Never       Breastfeeding: Formula  Bleeding Resolved: Went away 15 days postpartum.  Irregular since delivery   Reviewed birth control options. Desires Depo.  Last Pap: NILM HPV neg 23     1. Have you been to the ER, urgent care clinic, or hospitalized since your last visit?No    2. Have you seen or consulted any other health care providers outside of the [facility] since your last visit? No    She declines  a chaperone during the gynecologic exam today.      Kenya Lacey LPN

## 2024-05-17 ENCOUNTER — POSTPARTUM VISIT (OUTPATIENT)
Age: 34
End: 2024-05-17

## 2024-05-17 VITALS — SYSTOLIC BLOOD PRESSURE: 120 MMHG | BODY MASS INDEX: 33.57 KG/M2 | DIASTOLIC BLOOD PRESSURE: 80 MMHG | WEIGHT: 208 LBS

## 2024-05-17 PROBLEM — O09.523 MULTIGRAVIDA OF ADVANCED MATERNAL AGE IN THIRD TRIMESTER: Status: RESOLVED | Noted: 2023-12-27 | Resolved: 2024-05-17

## 2024-05-17 PROBLEM — O36.5990 FETAL GROWTH RESTRICTION ANTEPARTUM: Status: RESOLVED | Noted: 2024-03-29 | Resolved: 2024-05-17

## 2024-05-17 PROBLEM — Z34.90 PREGNANT: Status: RESOLVED | Noted: 2024-01-19 | Resolved: 2024-05-17

## 2024-05-17 PROBLEM — O30.033 MONOCHORIONIC DIAMNIOTIC TWIN GESTATION IN THIRD TRIMESTER: Status: RESOLVED | Noted: 2023-12-27 | Resolved: 2024-05-17

## 2024-05-17 PROCEDURE — 0503F POSTPARTUM CARE VISIT: CPT | Performed by: OBSTETRICS & GYNECOLOGY

## 2024-05-17 NOTE — PROGRESS NOTES
Postpartum evaluation     services #: [id number]    Nano Canales is a 33 y.o. female who presents for a postpartum exam.     She is now six weeks s/p  mono-di twins 24 'Dot and Dee'.    Mom and baby are doing well.     Concerns: denies    Mood stable, denies anxiety/depression. Just tired!    Incision/Perineum healing well.     Bleeding/lochia appropriate. Has not yet had menses. Some irregular light bleeding the last couple of weeks.    The patient is formula feeding without difficulty.     The patient would like depo for menstrual control.    Last pap: NILM HPV neg 23       /80   Wt 94.3 kg (208 lb)   LMP 2023 (Approximate)   Breastfeeding No   BMI 33.57 kg/m²     PHYSICAL EXAMINATION    Constitutional  Appearance: well-nourished, well developed, alert, in no acute distress    HENT  Head and Face: appears normal    Gastrointestinal  Abdominal Examination: abdomen non-tender to palpation, normal bowel sounds, no masses present  Liver and spleen: no hepatomegaly present, spleen not palpable  Hernias: no hernias identified    Neurologic/Psychiatric  Mental Status:  Orientation: grossly oriented to person, place and time  Mood and Affect: mood normal, affect appropriate    Assessment:  Normal postpartum check    Plan:  Rx for depo provera    RTC for AE or sooner angel luis Medina MD  2024  3:32 PM

## 2024-06-05 NOTE — PROGRESS NOTES
Please see ultrasound report under Imaging tab or Media tab.  
You can access the FollowMyHealth Patient Portal offered by [facility] by registering at the following website: [email or web address]. By joining [facility]’s FollowMyHealth portal, you will also be able to view your health information using other applications (apps) compatible with our system.